# Patient Record
Sex: MALE | Race: BLACK OR AFRICAN AMERICAN | HISPANIC OR LATINO | Employment: UNEMPLOYED | URBAN - METROPOLITAN AREA
[De-identification: names, ages, dates, MRNs, and addresses within clinical notes are randomized per-mention and may not be internally consistent; named-entity substitution may affect disease eponyms.]

---
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
## 2020-03-06 ENCOUNTER — OFFICE VISIT (OUTPATIENT)
Dept: FAMILY MEDICINE | Facility: CLINIC | Age: 20
End: 2020-03-06
Payer: COMMERCIAL

## 2020-03-06 VITALS
RESPIRATION RATE: 16 BRPM | DIASTOLIC BLOOD PRESSURE: 86 MMHG | HEART RATE: 84 BPM | TEMPERATURE: 98.4 F | HEIGHT: 65 IN | BODY MASS INDEX: 38.35 KG/M2 | WEIGHT: 230.2 LBS | SYSTOLIC BLOOD PRESSURE: 133 MMHG | OXYGEN SATURATION: 95 %

## 2020-03-06 DIAGNOSIS — Z00.121 ENCOUNTER FOR ROUTINE CHILD HEALTH EXAMINATION WITH ABNORMAL FINDINGS: Primary | ICD-10-CM

## 2020-03-06 DIAGNOSIS — Z51.81 ENCOUNTER FOR THERAPEUTIC DRUG MONITORING: ICD-10-CM

## 2020-03-06 DIAGNOSIS — Z13.9 SCREENING FOR CONDITION: ICD-10-CM

## 2020-03-06 DIAGNOSIS — Z00.00 ROUTINE GENERAL MEDICAL EXAMINATION AT A HEALTH CARE FACILITY: ICD-10-CM

## 2020-03-06 LAB
CHOLEST SERPL-MCNC: 202.8 MG/DL (ref 0–200)
CHOLEST/HDLC SERPL: 4.2 {RATIO} (ref 0–5)
HBA1C MFR BLD: 4.9 % (ref 4.1–5.7)
HDLC SERPL-MCNC: 47.9 MG/DL
LDLC SERPL CALC-MCNC: 140 MG/DL (ref 0–129)
TRIGL SERPL-MCNC: 72.5 MG/DL (ref 0–150)
VLDL CHOLESTEROL: 14.5 MG/DL (ref 7–32)

## 2020-03-06 ASSESSMENT — ANXIETY QUESTIONNAIRES
5. BEING SO RESTLESS THAT IT IS HARD TO SIT STILL: NOT AT ALL
3. WORRYING TOO MUCH ABOUT DIFFERENT THINGS: NOT AT ALL
6. BECOMING EASILY ANNOYED OR IRRITABLE: NOT AT ALL
1. FEELING NERVOUS, ANXIOUS, OR ON EDGE: NOT AT ALL
IF YOU CHECKED OFF ANY PROBLEMS ON THIS QUESTIONNAIRE, HOW DIFFICULT HAVE THESE PROBLEMS MADE IT FOR YOU TO DO YOUR WORK, TAKE CARE OF THINGS AT HOME, OR GET ALONG WITH OTHER PEOPLE: NOT DIFFICULT AT ALL
7. FEELING AFRAID AS IF SOMETHING AWFUL MIGHT HAPPEN: NOT AT ALL
GAD7 TOTAL SCORE: 0
2. NOT BEING ABLE TO STOP OR CONTROL WORRYING: NOT AT ALL

## 2020-03-06 ASSESSMENT — PATIENT HEALTH QUESTIONNAIRE - PHQ9: 5. POOR APPETITE OR OVEREATING: NOT AT ALL

## 2020-03-06 ASSESSMENT — MIFFLIN-ST. JEOR: SCORE: 1986.06

## 2020-03-06 ASSESSMENT — PAIN SCALES - GENERAL: PAINLEVEL: NO PAIN (0)

## 2020-03-06 NOTE — PROGRESS NOTES
"  3  SUBJECTIVE:   CC: Dre Mcdaniel Jr. is an 19 year old male who presents for preventive health visit.     Healthy Habits:    Do you get at least three servings of calcium containing foods daily (dairy, green leafy vegetables, etc.)? yes    Amount of exercise or daily activities, outside of work: 3 day(s) per week    Problems taking medications regularly No    Medication side effects: Yes weight gain    Have you had an eye exam in the past two years? no    Do you see a dentist twice per year? yes    Do you have sleep apnea, excessive snoring or daytime drowsiness?yes          Today's PHQ-2 Score:   PHQ-2 ( 1999 Pfizer) 3/6/2020 10/29/2019   Q1: Little interest or pleasure in doing things 0 1   Q2: Feeling down, depressed or hopeless 0 1   PHQ-2 Score 0 2     Patient scores a 0 on PHQ 9 and 2 though discussion with patient's group home staff he reports that things actual mood is is is significantly depressed.  Abuse: Current or Past(Physical, Sexual or Emotional)- No  Do you feel safe in your environment? Yes        Social History     Tobacco Use     Smoking status: Current Every Day Smoker     Packs/day: 0.50     Smokeless tobacco: Never Used   Substance Use Topics     Alcohol use: Yes     Comment: sometimes, denies weekly use     If you drink alcohol do you typically have >3 drinks per day or >7 drinks per week? No                      Tanner Sierra MD  [facility]  Child & Teen Check Up Year 18-20         Health History       Growth Percentile:    Wt Readings from Last 3 Encounters:   03/06/20 104.4 kg (230 lb 3.2 oz) (98 %)*   10/29/19 102.2 kg (225 lb 6.4 oz) (98 %)*   08/14/19 96.3 kg (212 lb 6.4 oz) (96 %)*     * Growth percentiles are based on CDC (Boys, 2-20 Years) data.      Ht Readings from Last 2 Encounters:   03/06/20 1.651 m (5' 5\") (5 %)*   10/29/19 1.651 m (5' 5\") (5 %)*     * Growth percentiles are based on CDC (Boys, 2-20 Years) data.    >99 %ile based on CDC (Boys, 2-20 " "Years) BMI-for-age based on body measurements available as of 3/6/2020.    Visit Vitals: /86   Pulse 84   Temp 98.4  F (36.9  C) (Oral)   Resp 16   Ht 1.651 m (5' 5\")   Wt 104.4 kg (230 lb 3.2 oz)   SpO2 95%   BMI 38.31 kg/m    BP Percentile: Blood pressure percentiles are not available for patients who are 18 years or older.    Informant: Patient,     Patient, Family speaks English and so an  was not used.  Family History:   Family History   Problem Relation Age of Onset     Diabetes Father      Coronary Artery Disease No family hx of      Hypertension No family hx of      Hyperlipidemia No family hx of      Cerebrovascular Disease No family hx of      Breast Cancer No family hx of      Colon Cancer No family hx of      Prostate Cancer No family hx of      Glaucoma No family hx of      Macular Degeneration No family hx of        Dyslipidemia Screening:  Pediatric hyperlipidemia risk factors discussed today: Family history of early cardiac disease and Elevated BMI >85th percentile  Lipid screening performed (recommended if any risk factors): Yes    Social History:     Did the family/guardian worry about wether their food would run out before they got money to buy more? No  Did the family/guardian find that the food they bought didn't last long enough and they didn't have money to get more?  No    Social History     Socioeconomic History     Marital status: Single     Spouse name: None     Number of children: None     Years of education: None     Highest education level: None   Occupational History     None   Social Needs     Financial resource strain: None     Food insecurity:     Worry: None     Inability: None     Transportation needs:     Medical: None     Non-medical: None   Tobacco Use     Smoking status: Current Every Day Smoker     Packs/day: 0.50     Smokeless tobacco: Never Used   Substance and Sexual Activity     Alcohol use: Yes     Comment: sometimes, denies weekly use     Drug " use: No     Sexual activity: Never   Lifestyle     Physical activity:     Days per week: None     Minutes per session: None     Stress: None   Relationships     Social connections:     Talks on phone: None     Gets together: None     Attends [employer] service: None     Active member of club or organization: None     Attends meetings of clubs or organizations: None     Relationship status: None     Intimate partner violence:     Fear of current or ex partner: None     Emotionally abused: None     Physically abused: None     Forced sexual activity: None   Other Topics Concern     None   Social History Narrative     None           Medical History:   Past Medical History:   Diagnosis Date     ADD (attention deficit disorder)      Anxiety      Depressive disorder      Hypertension        Family History and past Medical History reviewed and unchanged/updated.      Vision Screen: Passed.  Hearing Screen: Passed.  Parental/or patient concerns: Parent was not accompanying him today with group home staff notes expressed significant concern about his excessive carbohydrate intake and that he would get another house member to go and buy sweets for him.    Daily Activities: He has not been very active lately reports that he does when to get a job at [employer].    Nutrition:    Describe intake: Not very very mostly carbohydrates.    Environmental Risks:  TB exposure: No  Guns in house:None    STI Screening:  STI (including HIV) risk behaviors discussed today: No  HIV Screening (required once between ages 15-18 yrs): Ordered today  Other STI screening preformed (recommended if risk factors): No    Dental:  Have you been to a dentist this year? Yes and verbally encouraged family to continue to have annual dental check-up       Mental Health:  Teen Screen Discussed?: Yes       HEADSSS SCREENING:    HOME  Do you get along with your parents/siblings? Yes  Do you have at least one adult you can really talk to? Yes    EDUCATION  Do you  "have career or college plans after high school? Yes    ACTIVITIES  Do you get some exercise at least 3 times a week? Yes  Do you feel you are about the right weight for your height? No    DRUGS  Do you smoke cigarettes or chew tobacco? Yes 3 cigs a day   Do you drink alcohol or use any type of drugs? No    SEX   Have you ever had sex? No    SUICIDE/DEPRESSION  Do you ever feel down or depressed?     SAFETY  Do you feel afraid in any of your relationships? No         ROS   GENERAL: no recent fevers and activity level has been normal  SKIN: Negative for rash, birthmarks, acne, pigmentation changes  HEENT: Negative for hearing problems, vision problems, nasal congestion, eye discharge and eye redness  RESP: No cough, wheezing, difficulty breathing  CV: No cyanosis, fatigue with feeding  GI: Normal stools for age, no diarrhea or constipation   : Normal urination, no disharge or painful urination  MS: No swelling, muscle weakness, joint problems  NEURO: Moves all extremeties normally, normal activity for age  ALLERGY/IMMUNE: See allergy in history         Physical Exam:   /86   Pulse 84   Temp 98.4  F (36.9  C) (Oral)   Resp 16   Ht 1.651 m (5' 5\")   Wt 104.4 kg (230 lb 3.2 oz)   SpO2 95%   BMI 38.31 kg/m       GENERAL: Alert, well nourished, well developed, no acute distress, interacts appropriately for age  SKIN: skin is clear, no rash, acne, abnormal pigmentation or lesions  HEAD: The head is normocephalic.  EYES:The conjunctivae and cornea normal. PERRL, EOMI, Light reflex is symmetric and no eye movement on cover/uncover test. Sharp optic discs  EARS: The external auditory canals are clear and the tympanic membranes are normal; gray and transluscent.  NOSE: Clear, no discharge or congestion  MOUTH/THROAT: The throat is clear, tonsils:normal, no exudate or lesions. Normal teeth without obvious abnormalities  NECK: The neck is supple and thyroid is normal, no masses  LYMPH NODES: No adenopathy  LUNGS: " The lung fields are clear to auscultation,no rales, rhonchi, wheezing or retractions  HEART: The precordium is quiet. Rhythm is regular. S1 and S2 are normal. No murmurs.  ABDOMEN: The bowel sounds are normal. Abdomen soft, non tender,  non distended, no masses or hepatosplenomegaly.  M-GENITALIA: Patient declined genital exam.  M-BREASTS: Normal, no gynecomastia or abnormalities  EXTREMITIES: Symmetric extremities, FROM, no deformities. Spine is straight, no scoliosis  NEUROLOGIC: No focal findings. Cranial nerves grossly intact: DTR's normal. Normal gait, strength and tone         Assessment and Plan   Reason for Visit:   Chief Complaint   Patient presents with     Physical     Patient is here for annual exam      Forms     Medical/ specialist visit information      Additional Diagnoses: Patient has a schizophrenia spectrum disorder and is on atypical antipsychotics which is likely contributed to his obesity.    No referrals were made today. and patient was asked to return in 2 to 4 weeks.    Patient Health Questionnaire - 9   PHQ 8/31/2018 3/6/2020 3/7/2020   PHQ-9 Total Score 6 0 4   Q9: Thoughts of better off dead/self-harm past 2 weeks Several days Not at all Not at all   F/U: Thoughts of suicide or self-harm Yes - -   F/U: Self harm-plan Yes - -   F/U: Self-harm action No - -   F/U: Safety concerns Yes - -            Immunizations:    Hx immunization reactions?  No  Immunization schedule reviewed: Yes:  Following immunizations advised:  Tdap (if not given when entering 7th grade) Up to date for this immunization  Influenza if in season:Up to date for this immunization  Meningococcal (MCV) (If given before age 16 needs a booster at 16+ yo Up to date for this immunization  HPV Vaccine (Gardasil)  recommended for all at age 11 years: Up to date for this immunization    Labs:  Because patient is obesity and risk factors being on atypical antipsychotics will get an A1c and lipids today.  Results for orders placed  or performed in visit on 03/06/20 (from the past 48 hour(s))   Lipid Cascade ([facility])   Result Value Ref Range    Cholesterol 202.8 (H) 0.0 - 200.0 mg/dL    Cholesterol/HDL Ratio 4.2 0.0 - 5.0    HDL Cholesterol 47.9 >40.0 mg/dL    Triglycerides 72.5 0.0 - 150.0 mg/dL    VLDL Cholesterol 14.5 7.0 - 32.0 mg/dL    LDL Cholesterol Calculated 140 (H) 0 - 129 mg/dL   Hemoglobin A1c ([facility])   Result Value Ref Range    Hemoglobin A1C 4.9 4.1 - 5.7 %     I had a long discussion with patient and his group home staff about his obesity  Set a calorie intake patient at this point is not motivated to change his calories of the does report that he would like to increase his his activity though from previous experience she is likely to  Schedule next visit in 2 years    Tanner Sierra MD

## 2020-03-06 NOTE — LETTER
March 7, 2020      Dre Mcdaniel Jr.  [address] [place] [id number]        Dear Dre,    Thank you for getting your care at [facility]. Please see below for your test results.  Your diabetes test was normal but your cholesterol has risen above 200 I am concerned about this and will talk about this at your next visit in about a month.    Resulted Orders   Lipid Cascade ([facility])   Result Value Ref Range    Cholesterol 202.8 (H) 0.0 - 200.0 mg/dL    Cholesterol/HDL Ratio 4.2 0.0 - 5.0    HDL Cholesterol 47.9 >40.0 mg/dL    Triglycerides 72.5 0.0 - 150.0 mg/dL    VLDL Cholesterol 14.5 7.0 - 32.0 mg/dL    LDL Cholesterol Calculated 140 (H) 0 - 129 mg/dL   Hemoglobin A1c ([facility])   Result Value Ref Range    Hemoglobin A1C 4.9 4.1 - 5.7 %       As we discussed at our last visit, please follow up with a clinic appointment to review these results further.    Sincerely,    Tanner Sierra MD

## 2020-03-07 ASSESSMENT — ANXIETY QUESTIONNAIRES: GAD7 TOTAL SCORE: 0

## 2020-03-07 ASSESSMENT — PATIENT HEALTH QUESTIONNAIRE - PHQ9: SUM OF ALL RESPONSES TO PHQ QUESTIONS 1-9: 4

## 2020-03-09 LAB — HIV 1+2 AB+HIV1 P24 AG SERPL QL IA: NONREACTIVE

## 2020-04-23 DIAGNOSIS — F51.01 PRIMARY INSOMNIA: ICD-10-CM

## 2020-04-23 NOTE — TELEPHONE ENCOUNTER

## 2020-06-02 DIAGNOSIS — F41.9 ANXIETY: ICD-10-CM

## 2020-06-02 DIAGNOSIS — K59.03 DRUG-INDUCED CONSTIPATION: ICD-10-CM

## 2020-06-02 DIAGNOSIS — F29 SCHIZOPHRENIA SPECTRUM DISORDER WITH PSYCHOTIC DISORDER TYPE NOT YET DETERMINED (H): ICD-10-CM

## 2020-06-02 RX ORDER — SENNOSIDES A AND B 8.6 MG/1
17.2 TABLET, FILM COATED ORAL DAILY PRN
Qty: 62 TABLET | Refills: 1 | Status: SHIPPED | OUTPATIENT
Start: 2020-06-02 | End: 2023-06-09

## 2020-06-02 RX ORDER — HYDROXYZINE PAMOATE 50 MG/1
50 CAPSULE ORAL EVERY 6 HOURS PRN
Qty: 124 CAPSULE | Refills: 1 | Status: SHIPPED | OUTPATIENT
Start: 2020-06-02 | End: 2023-07-14

## 2020-06-02 NOTE — TELEPHONE ENCOUNTER

## 2020-06-22 DIAGNOSIS — F29 SCHIZOPHRENIA SPECTRUM DISORDER WITH PSYCHOTIC DISORDER TYPE NOT YET DETERMINED (H): ICD-10-CM

## 2020-06-22 DIAGNOSIS — J30.1 NON-SEASONAL ALLERGIC RHINITIS DUE TO POLLEN: ICD-10-CM

## 2020-06-22 RX ORDER — LORATADINE 10 MG/1
10 TABLET ORAL DAILY
Qty: 90 TABLET | Refills: 3 | Status: SHIPPED | OUTPATIENT
Start: 2020-06-22 | End: 2021-05-13

## 2020-06-22 NOTE — TELEPHONE ENCOUNTER
"Request for medication refill: Loratadine     Providers if patient needs an appointment and you are willing to give a one month supply please refill for one month and  send a letter/MyChart using \".SMILLIMITEDREFILL\" .smillimited and route chart to \"P SMI \" (Giving one month refill in non controlled medications is strongly recommended before denial)    If refill has been denied, meaning absolutely no refills without visit, please complete the smart phrase \".smirxrefuse\" and route it to the \"P SMI MED REFILLS\"  pool to inform the patient and the pharmacy.    Kortney Gonzales, [facility]        "

## 2020-07-31 NOTE — PROGRESS NOTES
HPI:  Patient presents for a annual visit. Patient sees well without glasses.       Pertinent Medical History:    Borderline intellectual functioning.     Neuroleptic malignant syndrome    Tachycardia    Obesity    Shizophrenia spectrum disorder    Ocular History:    None    Eye Medications:    None    Assessment and Plan:  1.   Myopia, both eyes.     DVO. Polycarbonate.       Medical History:  Past Medical History:   Diagnosis Date     ADD (attention deficit disorder)      Anxiety      Depressive disorder      Hypertension        Medications:  Current Outpatient Medications   Medication Sig Dispense Refill     benztropine (COGENTIN) 0.5 MG tablet Take 1 tablet (0.5 mg) by mouth daily 31 tablet 1     busPIRone (BUSPAR) 15 MG tablet Take 1 tablet (15 mg) by mouth 3 times daily 93 tablet 1     hydrOXYzine (VISTARIL) 50 MG capsule Take 1 capsule (50 mg) by mouth every 6 hours as needed for anxiety 124 capsule 1     loratadine (CLARITIN) 10 MG tablet Take 1 tablet (10 mg) by mouth daily 90 tablet 3     melatonin 5 MG tablet Take 1 tablet (5 mg) by mouth nightly as needed for sleep 90 tablet 1     propranolol (INDERAL) 20 MG tablet Take 20 mg by mouth daily       QUEtiapine (SEROQUEL) 200 MG tablet Take 1 tablet (200 mg) by mouth At Bedtime 31 tablet 1     risperiDONE (RISPERDAL) 1 MG tablet Take 1 tablet (1 mg) by mouth At Bedtime (added to 6 mg total dose 7mg) no further refills until seen 31 tablet 0     risperiDONE (RISPERDAL) 3 MG tablet Take 2 tablets (6 mg) by mouth At Bedtime (Added to 1 mg tab total dose 7 mg) 62 tablet 1     senna (SENNA-TIME) 8.6 MG tablet Take 2 tablets (17.2 mg) by mouth daily as needed (constipation) 62 tablet 1     sertraline (ZOLOFT) 50 MG tablet Take 1 tablet (50 mg) by mouth every morning 31 tablet 1   Complete documentation of historical and exam elements from today's encounter can be found in the full encounter summary report (not reduplicated in this progress note). I personally  obtained the chief complaint(s) and history of present illness.  I confirmed and edited as necessary the review of systems, past medical/surgical history, family history, social history, and examination findings as documented by others; and I examined the patient myself. I personally reviewed the relevant tests, images, and reports as documented above. I formulated and edited as necessary the assessment and plan and discussed the findings and management plan with the patient and family. - Ferdinand Pal, RENALDO

## 2020-08-03 ENCOUNTER — OFFICE VISIT (OUTPATIENT)
Dept: OPHTHALMOLOGY | Facility: CLINIC | Age: 20
End: 2020-08-03
Attending: OPTOMETRIST
Payer: COMMERCIAL

## 2020-08-03 DIAGNOSIS — H52.13 MYOPIA OF BOTH EYES: Primary | ICD-10-CM

## 2020-08-03 PROCEDURE — 92015 DETERMINE REFRACTIVE STATE: CPT | Mod: ZF

## 2020-08-03 PROCEDURE — G0463 HOSPITAL OUTPT CLINIC VISIT: HCPCS | Mod: ZF

## 2020-08-03 ASSESSMENT — EXTERNAL EXAM - LEFT EYE: OS_EXAM: NORMAL

## 2020-08-03 ASSESSMENT — VISUAL ACUITY
METHOD: SNELLEN - LINEAR
OS_CC: 20/25
CORRECTION_TYPE: GLASSES
OD_CC: 20/20

## 2020-08-03 ASSESSMENT — SLIT LAMP EXAM - LIDS
COMMENTS: NORMAL
COMMENTS: NORMAL

## 2020-08-03 ASSESSMENT — REFRACTION_MANIFEST
OD_CYLINDER: SPHERE
OS_CYLINDER: +0.50
OS_SPHERE: -0.75
OD_SPHERE: -0.50
OS_AXIS: 100

## 2020-08-03 ASSESSMENT — TONOMETRY
OS_IOP_MMHG: 15
OD_IOP_MMHG: 16
IOP_METHOD: TONOPEN

## 2020-08-03 ASSESSMENT — REFRACTION_WEARINGRX
OD_CYLINDER: SPHERE
OS_SPHERE: -0.75
OD_SPHERE: -0.50
OS_CYLINDER: SPHERE

## 2020-08-03 ASSESSMENT — EXTERNAL EXAM - RIGHT EYE: OD_EXAM: NORMAL

## 2020-08-03 ASSESSMENT — CONF VISUAL FIELD
OS_NORMAL: 1
METHOD: COUNTING FINGERS
OD_NORMAL: 1

## 2020-08-03 NOTE — NURSING NOTE
Chief Complaints and History of Present Illnesses   Patient presents with     Myopia Follow Up     1 year follow up both eyes.       Chief Complaint(s) and History of Present Illness(es)     Myopia Follow Up     Laterality: both eyes    Comments: 1 year follow up both eyes.                Comments     Pt states vision is the same as last visit. No eye pain today.  No flashes or floaters.    Pt is prediabetic, does not check sugars.  Lab Results       Component                Value               Date                       A1C                      4.9                 03/06/2020                 A1C                      4.8                 03/21/2019              LEYDA Caldera August 3, 2020 11:04 AM

## 2020-09-17 DIAGNOSIS — F51.01 PRIMARY INSOMNIA: ICD-10-CM

## 2020-09-17 NOTE — TELEPHONE ENCOUNTER

## 2020-10-30 ENCOUNTER — OFFICE VISIT (OUTPATIENT)
Dept: FAMILY MEDICINE | Facility: CLINIC | Age: 20
End: 2020-10-30
Payer: COMMERCIAL

## 2020-10-30 VITALS
OXYGEN SATURATION: 98 % | BODY MASS INDEX: 37.49 KG/M2 | TEMPERATURE: 98.7 F | HEIGHT: 65 IN | DIASTOLIC BLOOD PRESSURE: 84 MMHG | SYSTOLIC BLOOD PRESSURE: 118 MMHG | WEIGHT: 225 LBS | HEART RATE: 80 BPM

## 2020-10-30 DIAGNOSIS — E66.09 CLASS 1 OBESITY DUE TO EXCESS CALORIES WITHOUT SERIOUS COMORBIDITY WITH BODY MASS INDEX (BMI) OF 34.0 TO 34.9 IN ADULT: ICD-10-CM

## 2020-10-30 DIAGNOSIS — Z00.00 ENCOUNTER FOR PREVENTIVE CARE: Primary | ICD-10-CM

## 2020-10-30 DIAGNOSIS — E66.811 CLASS 1 OBESITY DUE TO EXCESS CALORIES WITHOUT SERIOUS COMORBIDITY WITH BODY MASS INDEX (BMI) OF 34.0 TO 34.9 IN ADULT: ICD-10-CM

## 2020-10-30 DIAGNOSIS — Z23 NEED FOR PROPHYLACTIC VACCINATION AND INOCULATION AGAINST INFLUENZA: ICD-10-CM

## 2020-10-30 PROCEDURE — 99213 OFFICE O/P EST LOW 20 MIN: CPT | Mod: 25 | Performed by: FAMILY MEDICINE

## 2020-10-30 PROCEDURE — 90472 IMMUNIZATION ADMIN EACH ADD: CPT | Performed by: FAMILY MEDICINE

## 2020-10-30 PROCEDURE — 90686 IIV4 VACC NO PRSV 0.5 ML IM: CPT | Performed by: FAMILY MEDICINE

## 2020-10-30 PROCEDURE — 90471 IMMUNIZATION ADMIN: CPT | Performed by: FAMILY MEDICINE

## 2020-10-30 PROCEDURE — 90732 PPSV23 VACC 2 YRS+ SUBQ/IM: CPT | Performed by: FAMILY MEDICINE

## 2020-10-30 ASSESSMENT — MIFFLIN-ST. JEOR: SCORE: 1957.47

## 2020-10-30 NOTE — PATIENT INSTRUCTIONS
Here is the plan from today's visit    1. Encounter for preventive care  Make a lab appointment for quanteferron and Hep C  - Hepatitis C Screen Reflex to HCV RNA Quant and Genotype; Future    2 Vitaliy loss   2 fruits or vegies a day and increase exercise to 3 walks a week   Please call or return to clinic if your symptoms don't go away.    Follow up plan  Return in about 6 months (around 4/30/2021).     Thank you for coming to [facility] today.  Lab Testing:  **If you had lab testing today and your results are reassuring or normal they will be mailed to you or sent through [facility] within 7 days.   **If the lab tests need quick action we will call you with the results.  The phone number we will call with results is # [phone] (home) . If this is not the best number please call our clinic and change the number.  Medication Refills:  If you need any refills please call your pharmacy and they will contact us.   If you need to  your refill at a new pharmacy, please contact the new pharmacy directly. The new pharmacy will help you get your medications transferred faster.   Scheduling:  If you have any concerns about today's visit or wish to schedule another appointment please call our office during normal business hours [phone] (8-5:00 M-F)        Medical Concerns:  If you have urgent medical concerns please call [phone] at any time of the day.    Tanner Sierra MD

## 2020-10-30 NOTE — PROGRESS NOTES
Preceptor Attestation:    I was present with the medical student who participated in the service and in the documentation of this note. I have verified the history and personally performed the physical exam and medical decision making, and have verified the content of the note, which accurately reflects my assessment of the patient and the plan of care.   Tanner Sierra MD            Dre is a 20 year old  who presents for   Patient presents with:  Forms: [employer] mobility forms  Imm/Inj: Flu Shot      Assessment and Plan      1. Encounter for preventive care  Adonis presents today with his house leader Rich.  He needed some forms filled out in terms of [employer] mobility and also needed some testing done.  He will return to get the hepatitis C screening and the pneumococcal vaccine.  - Hepatitis C Screen Reflex to HCV RNA Quant and Genotype; Future  - Pneumococcal vaccine 23 valent PPSV23  (Pneumovax) [57167]    2. Class 1 obesity due to excess calories without serious comorbidity with body mass index (BMI) of 34.0 to 34.9 in adult  We did spend a considerable time talking about his weight it is likely related to his antipsychotics his high calorie carbohydrate diet and his relatively sedentary lifestyle.  I did talk about different options at this point he is not willing to consider significant changes in his diet or exercise plan.  He is willing to try eat 1-2 fruits or vegetables a day to be mostly bananas.  He also committed to going for a walk once or twice a week.    3. Need for prophylactic vaccination and inoculation against influenza  He was given a influenza vaccine today  - INFLUENZA VACCINE IM > 6 MONTHS VALENT IIV4 [82805]     Return in about 6 months (around 4/30/2021).    There are no discontinued medications.      Tanner Sierra MD         [facility]       Dre is a 20 year old  who presents for   Patient presents with:  Forms: [employer] mobility forms  Imm/Inj: Flu Shot        Visit [facility]  1. Needs flushot,  "pneumonia and mobility forms  Dre presents with his care assistant Rich.  We discussed the need for testing for hepatitis C and immunizations.  2. Obesity  Would like to avoid DM. Will exercise more. Has unrealistic attitudes about weight loss.   And when expresses a strong desire to avoid getting diabetes.  He continues to have unrealistic expectations of weight control.  He has lost about 5 pounds since his last visit so this is a huge positive      Problem, Medication and Allergy Lists were reviewed and updated if needed..  Patient is an established patient of this clinic.         Review of Systems:   Review of Systems  7 point review of symptoms was otherwise negative except as noted in HPI         Physical Exam:     Vitals:    10/30/20 1319   BP: 118/84   Pulse: 80   Temp: 98.7  F (37.1  C)   TempSrc: Oral   SpO2: 98%   Weight: 102.1 kg (225 lb)   Height: 1.651 m (5' 5\")     Body mass index is 37.44 kg/m .  Vitals were reviewed and were normal     Physical Exam  Vitals signs and nursing note reviewed.   Constitutional:       General: He is not in acute distress.     Appearance: He is well-developed.   Musculoskeletal:      Comments: Right toenail is dysmorphic and exuding a small amount of serous discharge and blood.   Neurological:      Mental Status: He is alert and oriented to person, place, and time.   Psychiatric:         Mood and Affect: Affect is blunt.         Speech: Speech is delayed.         Behavior: Behavior is slowed and withdrawn.         Thought Content: Thought content is not paranoid or delusional.         Judgment: Judgment is not impulsive.      Comments: MENTAL STATUS EXAM  Appearance: appropriate  Attitude: cooperative  Behavior: normal  Eye Contact: decreased   Speech: flat  Orientation: oriented to person , place, time and situation  Mood: depressed   Affect: Mood affect was flat  Thought Process: clear  Suicidal Ideation: reports no recent thoughts, no intention  Hallucination: " no  Overall much improved over previous visit.  He is more engaged and willing to problem solve             Results:   Results are ordered and pending    Options for treatment and follow-up care were reviewed with the patient. Dre Mcdaniel Jr.  engaged in the decision making process and verbalized understanding of the options discussed and agreed with the final plan.  Tanner Sierra MD  [facility]

## 2020-11-11 DIAGNOSIS — Z00.00 ENCOUNTER FOR PREVENTIVE CARE: ICD-10-CM

## 2020-11-11 DIAGNOSIS — Z00.00 ROUTINE GENERAL MEDICAL EXAMINATION AT A HEALTH CARE FACILITY: ICD-10-CM

## 2020-11-11 PROCEDURE — 36415 COLL VENOUS BLD VENIPUNCTURE: CPT | Performed by: FAMILY MEDICINE

## 2020-11-11 PROCEDURE — 86803 HEPATITIS C AB TEST: CPT | Performed by: FAMILY MEDICINE

## 2020-11-11 PROCEDURE — 86481 TB AG RESPONSE T-CELL SUSP: CPT | Performed by: FAMILY MEDICINE

## 2020-11-12 LAB — HCV AB SERPL QL IA: NONREACTIVE

## 2020-11-13 LAB
GAMMA INTERFERON BACKGROUND BLD IA-ACNC: 0.06 IU/ML
GAMMA INTERFERON BACKGROUND BLD IA-ACNC: NORMAL IU/ML
M TB IFN-G BLD-IMP: NORMAL
M TB IFN-G CD4+ BCKGRND COR BLD-ACNC: 9.94 IU/ML
M TB IFN-G CD4+ BCKGRND COR BLD-ACNC: NORMAL IU/ML
M TB TUBERC IFN-G BLD QL: NEGATIVE
MITOGEN IGNF BCKGRD COR BLD-ACNC: 0.03 IU/ML
MITOGEN IGNF BCKGRD COR BLD-ACNC: 0.12 IU/ML
MITOGEN IGNF BCKGRD COR BLD-ACNC: NORMAL IU/ML
MITOGEN IGNF BCKGRD COR BLD-ACNC: NORMAL IU/ML

## 2021-01-07 DIAGNOSIS — F51.01 PRIMARY INSOMNIA: ICD-10-CM

## 2021-03-01 ENCOUNTER — OFFICE VISIT (OUTPATIENT)
Dept: FAMILY MEDICINE | Facility: CLINIC | Age: 21
End: 2021-03-01
Payer: MEDICAID

## 2021-03-01 ENCOUNTER — MEDICAL CORRESPONDENCE (OUTPATIENT)
Dept: HEALTH INFORMATION MANAGEMENT | Facility: CLINIC | Age: 21
End: 2021-03-01

## 2021-03-01 VITALS
BODY MASS INDEX: 37.99 KG/M2 | SYSTOLIC BLOOD PRESSURE: 117 MMHG | DIASTOLIC BLOOD PRESSURE: 85 MMHG | WEIGHT: 228 LBS | HEART RATE: 87 BPM | HEIGHT: 65 IN | TEMPERATURE: 98.1 F | RESPIRATION RATE: 24 BRPM | OXYGEN SATURATION: 96 %

## 2021-03-01 DIAGNOSIS — E66.811 CLASS 1 OBESITY DUE TO EXCESS CALORIES WITHOUT SERIOUS COMORBIDITY WITH BODY MASS INDEX (BMI) OF 34.0 TO 34.9 IN ADULT: ICD-10-CM

## 2021-03-01 DIAGNOSIS — E66.09 CLASS 1 OBESITY DUE TO EXCESS CALORIES WITHOUT SERIOUS COMORBIDITY WITH BODY MASS INDEX (BMI) OF 34.0 TO 34.9 IN ADULT: ICD-10-CM

## 2021-03-01 DIAGNOSIS — F29 SCHIZOPHRENIA SPECTRUM DISORDER WITH PSYCHOTIC DISORDER TYPE NOT YET DETERMINED (H): ICD-10-CM

## 2021-03-01 DIAGNOSIS — Z00.129 ENCOUNTER FOR ROUTINE CHILD HEALTH EXAMINATION WITHOUT ABNORMAL FINDINGS: Primary | ICD-10-CM

## 2021-03-01 DIAGNOSIS — F51.01 PRIMARY INSOMNIA: ICD-10-CM

## 2021-03-01 PROBLEM — G21.0 NMS (NEUROLEPTIC MALIGNANT SYNDROME): Status: RESOLVED | Noted: 2018-10-07 | Resolved: 2021-03-01

## 2021-03-01 PROBLEM — R00.0 TACHYCARDIA: Status: RESOLVED | Noted: 2018-10-08 | Resolved: 2021-03-01

## 2021-03-01 PROBLEM — R51.9 PERSISTENT HEADACHES: Status: RESOLVED | Noted: 2019-08-01 | Resolved: 2021-03-01

## 2021-03-01 PROBLEM — R45.851 SUICIDAL IDEATION: Status: RESOLVED | Noted: 2018-08-20 | Resolved: 2021-03-01

## 2021-03-01 LAB
ANION GAP SERPL CALCULATED.3IONS-SCNC: 4 MMOL/L (ref 3–14)
BUN SERPL-MCNC: 9 MG/DL (ref 7–30)
CALCIUM SERPL-MCNC: 9.5 MG/DL (ref 8.5–10.1)
CHLORIDE SERPL-SCNC: 107 MMOL/L (ref 94–109)
CHOLEST SERPL-MCNC: 204 MG/DL
CO2 SERPL-SCNC: 27 MMOL/L (ref 20–32)
CREAT SERPL-MCNC: 0.71 MG/DL (ref 0.66–1.25)
GFR SERPL CREATININE-BSD FRML MDRD: >90 ML/MIN/{1.73_M2}
GLUCOSE SERPL-MCNC: 93 MG/DL (ref 70–99)
HBA1C MFR BLD: 4.9 % (ref 4.1–5.7)
HDLC SERPL-MCNC: 51 MG/DL
LDLC SERPL CALC-MCNC: 135 MG/DL
NONHDLC SERPL-MCNC: 153 MG/DL
POTASSIUM SERPL-SCNC: 3.9 MMOL/L (ref 3.4–5.3)
SODIUM SERPL-SCNC: 138 MMOL/L (ref 133–144)
TRIGL SERPL-MCNC: 92 MG/DL

## 2021-03-01 PROCEDURE — 36415 COLL VENOUS BLD VENIPUNCTURE: CPT | Performed by: FAMILY MEDICINE

## 2021-03-01 PROCEDURE — 83036 HEMOGLOBIN GLYCOSYLATED A1C: CPT | Performed by: FAMILY MEDICINE

## 2021-03-01 PROCEDURE — 99395 PREV VISIT EST AGE 18-39: CPT | Performed by: FAMILY MEDICINE

## 2021-03-01 PROCEDURE — 80061 LIPID PANEL: CPT | Performed by: FAMILY MEDICINE

## 2021-03-01 PROCEDURE — 80048 BASIC METABOLIC PNL TOTAL CA: CPT | Performed by: FAMILY MEDICINE

## 2021-03-01 ASSESSMENT — MIFFLIN-ST. JEOR: SCORE: 1971.08

## 2021-03-01 NOTE — LETTER
March 2, 2021      Dre Mcdaniel Jr.  [address] [place] [id number]        Dear Dre,    Thank you for getting your care at [facility]. Please see below for your test results. You have no sign of Diabetes, Your cholesterol is high -this should be managed by reducing the sugar and fat in your diet.    Resulted Orders   Hemoglobin A1c ([facility])   Result Value Ref Range    Hemoglobin A1C 4.9 4.1 - 5.7 %   Basic metabolic panel   Result Value Ref Range    Sodium 138 133 - 144 mmol/L    Potassium 3.9 3.4 - 5.3 mmol/L    Chloride 107 94 - 109 mmol/L    Carbon Dioxide 27 20 - 32 mmol/L    Anion Gap 4 3 - 14 mmol/L    Glucose 93 70 - 99 mg/dL    Urea Nitrogen 9 7 - 30 mg/dL    Creatinine 0.71 0.66 - 1.25 mg/dL    GFR Estimate >90 >60 mL/min/[1.73_m2]      Comment:      Non  GFR Calc  Starting 12/18/2018, serum creatinine based estimated GFR (eGFR) will be   calculated using the Chronic Kidney Disease Epidemiology Collaboration   (CKD-EPI) equation.      GFR Estimate If Black >90 >60 mL/min/[1.73_m2]      Comment:       GFR Calc  Starting 12/18/2018, serum creatinine based estimated GFR (eGFR) will be   calculated using the Chronic Kidney Disease Epidemiology Collaboration   (CKD-EPI) equation.      Calcium 9.5 8.5 - 10.1 mg/dL   Lipid panel reflex to direct LDL Fasting   Result Value Ref Range    Cholesterol 204 (H) <200 mg/dL      Comment:      Desirable:       <200 mg/dl    Triglycerides 92 <150 mg/dL    HDL Cholesterol 51 >39 mg/dL    LDL Cholesterol Calculated 135 (H) <100 mg/dL      Comment:      Above desirable:  100-129 mg/dl  Borderline High:  130-159 mg/dL  High:             160-189 mg/dL  Very high:       >189 mg/dl      Non HDL Cholesterol 153 (H) <130 mg/dL      Comment:      Above Desirable:  130-159 mg/dl  Borderline high:  160-189 mg/dl  High:             190-219 mg/dl  Very high:       >219 mg/dl         If you have any concerns about these  results please call and leave a message for me or send a MyChart message to the clinic.    Sincerely,    Tanner Sierra MD

## 2021-03-01 NOTE — PROGRESS NOTES
"  Child & Teen Check Up Year 18-20         Health History       Growth Percentile:    Wt Readings from Last 3 Encounters:   03/01/21 103.4 kg (228 lb)   10/30/20 102.1 kg (225 lb)   03/06/20 104.4 kg (230 lb 3.2 oz) (98 %, Z= 2.04)*     * Growth percentiles are based on [facility] (Boys, 2-20 Years) data.      Ht Readings from Last 2 Encounters:   03/01/21 1.651 m (5' 5\")   10/30/20 1.651 m (5' 5\")    Facility age limit for growth percentiles is 20 years.    Visit Vitals: /85   Pulse 87   Temp 98.1  F (36.7  C) (Oral)   Resp 24   Ht 1.651 m (5' 5\")   Wt 103.4 kg (228 lb)   SpO2 96%   BMI 37.94 kg/m    BP Percentile: Growth percentile Smart[employer] can only be used for patients less than 20 years old.    Informant: Patient, caregiver Rich    Patient, Family speaks English and so an  was not used.  Family History:   Family History   Problem Relation Age of Onset     Diabetes Father      Coronary Artery Disease No family hx of      Hypertension No family hx of      Hyperlipidemia No family hx of      Cerebrovascular Disease No family hx of      Breast Cancer No family hx of      Colon Cancer No family hx of      Prostate Cancer No family hx of      Glaucoma No family hx of      Macular Degeneration No family hx of        Dyslipidemia Screening:  Pediatric hyperlipidemia risk factors discussed today: Parents with triglycerides >240 and Elevated BMI >85th percentile  Lipid screening performed (recommended if any risk factors): Yes    Social History:     Did the family/guardian worry about wether their food would run out before they got money to buy more? No  Did the family/guardian find that the food they bought didn't last long enough and they didn't have money to get more?  No    Social History     Socioeconomic History     Marital status: Single     Spouse name: None     Number of children: None     Years of education: None     Highest education level: None   Occupational History     None   Social " Needs     Financial resource strain: None     Food insecurity     Worry: None     Inability: None     Transportation needs     Medical: None     Non-medical: None   Tobacco Use     Smoking status: Former Smoker     Packs/day: 0.50     Smokeless tobacco: Never Used   Substance and Sexual Activity     Alcohol use: Yes     Comment: sometimes, denies weekly use     Drug use: No     Sexual activity: Never   Lifestyle     Physical activity     Days per week: None     Minutes per session: None     Stress: None   Relationships     Social connections     Talks on phone: None     Gets together: None     Attends [employer]alian service: None     Active member of club or organization: None     Attends meetings of clubs or organizations: None     Relationship status: None     Intimate partner violence     Fear of current or ex partner: None     Emotionally abused: None     Physically abused: None     Forced sexual activity: None   Other Topics Concern     None   Social History Narrative     None         Medical History:   Past Medical History:   Diagnosis Date     ADD (attention deficit disorder)      Anxiety      Depressive disorder      Hypertension        Family History and past Medical History reviewed and unchanged/updated.      Parental/or patient concerns: no concerns    Daily Activities:    Nutrition:    Describe intake: [employer],     Environmental Risks:  TB exposure: No  Guns in house:None    STI Screening:  STI (including HIV) risk behaviors discussed today: No  HIV Screening (required once between ages 15-18 yrs): Previously completed  Other STI screening preformed (recommended if risk factors): No    Dental:  Have you been to a dentist this year? No-Verbal referral made  for dental check-up       Mental Health:  Teen Screen Discussed?: No        HEADSSS SCREENING:    HOME  Do you get along with your parents/siblings? Details: lives in group home  Do you have at least one adult you can really talk to? Yes    EDUCATION  Do you  "have career or college plans after high school? GED in progress    ACTIVITIES  Do you get some exercise at least 3 times a week? Details: on occassion  Do you feel you are about the right weight for your height? No    DRUGS  Do you smoke cigarettes or chew tobacco? No  Do you drink alcohol or use any type of drugs? No    SEX  Have you ever had sex? No    SUICIDE/DEPRESSION  Do you ever feel down or depressed? No    SAFETY  Do you feel afraid in any of your relationships? No  Nutrition:  Healthy between-meal snacks, Safety:  Alcohol/drugs/tobacco use. and Guidance:  Stress, nervousness, sadness.       ROS   GENERAL: no recent fevers and activity level has been normal  SKIN: Negative for rash, birthmarks, acne, pigmentation changes  HEENT: Negative for hearing problems, vision problems, nasal congestion, eye discharge and eye redness  RESP: No cough, wheezing, difficulty breathing  CV: No cyanosis, fatigue with feeding  GI: Normal stools for age, no diarrhea or constipation   : Normal urination, no disharge or painful urination  MS: No swelling, muscle weakness, joint problems  NEURO: Moves all extremeties normally, normal activity for age  ALLERGY/IMMUNE: See allergy in history         Physical Exam:   /85   Pulse 87   Temp 98.1  F (36.7  C) (Oral)   Resp 24   Ht 1.651 m (5' 5\")   Wt 103.4 kg (228 lb)   SpO2 96%   BMI 37.94 kg/m       GENERAL: Alert, well nourished, well developed, no acute distress, interacts appropriately for age  SKIN: skin is clear, no rash, acne, abnormal pigmentation or lesions  HEAD: The head is normocephalic.  EYES:The conjunctivae and cornea normal. PERRL, EOMI, Light reflex is symmetric and no eye movement on cover/uncover test. Sharp optic discs  EARS: The external auditory canals are clear and the tympanic membranes are normal; gray and transluscent.  NOSE: Clear, no discharge or congestion  MOUTH/THROAT: The throat is clear, tonsils:normal, no exudate or lesions. Normal " teeth without obvious abnormalities  NECK: The neck is supple and thyroid is normal, no masses  LYMPH NODES: No adenopathy  LUNGS: The lung fields are clear to auscultation,no rales, rhonchi, wheezing or retractions  HEART: The precordium is quiet. Rhythm is regular. S1 and S2 are normal. No murmurs.  ABDOMEN: The bowel sounds are normal. Abdomen soft, non tender,  non distended, no masses or hepatosplenomegaly.  M-GENITALIA: Normal male external genitalia. Tomás stage  5,  Testes descended bilateraly, no hernia or hydrocele. Circumcised: Yes  M-BREASTS: Normal, no gynecomastia or abnormalities  EXTREMITIES: Symmetric extremities, FROM, no deformities. Spine is straight, no scoliosis  NEUROLOGIC: No focal findings. Cranial nerves grossly intact: DTR's normal. Normal gait, strength and tone         Assessment and Plan   Reason for Visit:   Chief Complaint   Patient presents with     Well Child     no concerns     Additional Diagnoses: Obesity-patient reports that he would like to lose weight though he does not want to change his diet he is interested in starting to exercise maybe once a week.  We discussed that this is unlikely to be successful and if he were interested in in changing his diet could consider referring to a dietitian or working with him.  Schizophrenia spectrum disorder-seeing psychiatrist is currently stable and is a little improved mood.    Immunizations:    Hx immunization reactions?  No  Immunization schedule reviewed: Yes:  Following immunizations advised:  Tdap (if not given when entering 7th grade) Up to date for this immunization  Influenza if in season:Up to date for this immunization  Meningococcal (MCV) (If given before age 16 needs a booster at 16+ yo Up to date for this immunization  HPV Vaccine (Gardasil)  recommended for all at age 11 years: Up to date for this immunization    Labs:  Urinalysis: once between ages 12 and 20   Hemoglobin: once for menstruating adolescents between ages 12  and 20     Schedule next visit in 1 years    Tanner Sierra MD

## 2021-05-13 DIAGNOSIS — J30.1 NON-SEASONAL ALLERGIC RHINITIS DUE TO POLLEN: ICD-10-CM

## 2021-05-13 DIAGNOSIS — F29 SCHIZOPHRENIA SPECTRUM DISORDER WITH PSYCHOTIC DISORDER TYPE NOT YET DETERMINED (H): ICD-10-CM

## 2021-05-13 RX ORDER — LORATADINE 10 MG/1
10 TABLET ORAL DAILY
Qty: 90 TABLET | Refills: 3 | Status: SHIPPED | OUTPATIENT
Start: 2021-05-13 | End: 2022-04-20

## 2021-08-17 ENCOUNTER — OFFICE VISIT (OUTPATIENT)
Dept: FAMILY MEDICINE | Facility: CLINIC | Age: 21
End: 2021-08-17
Payer: MEDICARE

## 2021-08-17 VITALS
SYSTOLIC BLOOD PRESSURE: 125 MMHG | OXYGEN SATURATION: 98 % | WEIGHT: 225.2 LBS | HEART RATE: 93 BPM | TEMPERATURE: 98.1 F | HEIGHT: 65 IN | BODY MASS INDEX: 37.52 KG/M2 | DIASTOLIC BLOOD PRESSURE: 86 MMHG | RESPIRATION RATE: 16 BRPM

## 2021-08-17 DIAGNOSIS — N52.2 DRUG-INDUCED ERECTILE DYSFUNCTION: Primary | ICD-10-CM

## 2021-08-17 DIAGNOSIS — F51.01 PRIMARY INSOMNIA: ICD-10-CM

## 2021-08-17 PROCEDURE — 99214 OFFICE O/P EST MOD 30 MIN: CPT | Performed by: FAMILY MEDICINE

## 2021-08-17 ASSESSMENT — MIFFLIN-ST. JEOR: SCORE: 1953.38

## 2021-08-17 NOTE — PROGRESS NOTES
"  Assessment & Plan     (N52.2) Drug-induced erectile dysfunction (primary encounter diagnosis):  Patient reports lack of morning erections and decreased libido. Taking several medications associated with these side effects including Propranolol and Zoloft, which could be contributing. Elevated BMI and decreased physical activity likely also a component. Patient endorses smoking 7 cigarettes/day and pre-contemplative towards cessation- do not suspect this is the primary cause of his ED given other likely culprits.    - Advised patient to discuss changing medications/doses with psychiatrist. Has upcoming appointment scheduled.   - Encouraged physical activity. Patient set goal of 15-30 minutes cardio 3x weekly.   - Discussed smoking cessation. Patient will consider when ready.   - Patient will consider diet changes when ready    (F51.01) Primary insomnia: Medication refill provided.   - Melatonin 5 MG tablet    34 minutes spent on visit. Time spent doing chart review, history and exam, documentation and further activities per the [id number]}     BMI:   Estimated body mass index is 37.48 kg/m  as calculated from the following:    Height as of this encounter: 1.651 m (5' 5\").    Weight as of this encounter: 102.2 kg (225 lb 3.2 oz).   Weight management plan: Discussed healthy diet and exercise guidelines    Return in about 3 months (around 11/17/2021).    Savana Salinas, MS4    Tanner Sierra MD  [facility] is a 21 year old with past medical history significant for schizophrenia, RACHEL, episodic mood disorder, ADHD, and insomnia who is accompanied by his caretaker and presents for the following health issues:     HPI     Erectile Dysfunction  - Reports that he no longer has morning erections   - Has partial erections at times. Not sexually active and does not masturbate   - Low interest in sexual activity or masturbation   - Patient associates symptoms with starting " "Propranolol and Zoloft in 2018-19. Started while he was hospitalized   - Reports that his father had the same problem   - Concerned about this because he wants to have kids in the future and worried that this would make that harder   - Smokes 7 cigarettes per day. Not interested in quitting at this time. Likes the way they make him feel.   - No alcohol use   - No other recreational drugs   - No CP/SOB, fevers, difficulty urinating, dysuria, hematuria     Mood  - Reports mood has been good overall   - Lives in group home. States that he misses his family at times. Sees them occasionally and messages them on [place]   - Denies feeling depressed or hopeless   - Sleeping about 10 hours per night   - Denies SI/HI     Refills  - Needs refill of melatonin     Review of Systems   Constitutional, HEENT, cardiovascular, pulmonary, GI and  systems are negative, except as otherwise noted.      Objective    /86   Pulse 93   Temp 98.1  F (36.7  C) (Oral)   Resp 16   Ht 1.651 m (5' 5\")   Wt 102.2 kg (225 lb 3.2 oz)   SpO2 98%   BMI 37.48 kg/m    Body mass index is 37.48 kg/m .  Physical Exam   GENERAL: healthy, alert and no distress  RESP: lungs clear to auscultation - no rales, rhonchi or wheezes  CV: regular rate and rhythm, normal S1 S2, no S3 or S4, no murmur, click or rub  ABDOMEN: soft, nontender, no hepatosplenomegaly, no masses and bowel sounds normal  : Deferred- patient declined exam.   MS: no gross musculoskeletal defects noted, no edema    ----- Services Performed by a MEDICAL STUDENT in Presence of ATTENDING Physician-------        I was present with the medical student who participated in the service and in the documentation of this note. I have verified the history and personally performed the physical exam and medical decision making, and have verified the content of the note, which accurately reflects my assessment of the patient and the plan of care.   Tanner Sierra MD                  "

## 2021-08-17 NOTE — PATIENT INSTRUCTIONS
Here is the plan from today's visit    1. Primary insomnia  Refill provided.   - melatonin 5 MG tablet; Take 1 tablet (5 mg) by mouth At Bedtime  Dispense: 90 tablet; Refill: 3    2. Drug-induced erectile dysfunction  - Consider talk to psychiatrist about changing medication/doses of Propanolol or Zoloft.   - Goal of increasing exercise to 3x weekly, 15-30 minutes of running/elliptical.   - Consider stopping smoking when ready.   - Consider diet changes when ready.     Please call or return to clinic if your symptoms don't go away.    Follow up plan  Return in about 3 months (around 11/17/2021).     Thank you for coming to [facility] today.  Lab Testing:  **If you had lab testing today and your results are reassuring or normal they will be mailed to you or sent through [facility] within 7 days.   **If the lab tests need quick action we will call you with the results.  The phone number we will call with results is # [phone] (home) . If this is not the best number please call our clinic and change the number.  Medication Refills:  If you need any refills please call your pharmacy and they will contact us.   If you need to  your refill at a new pharmacy, please contact the new pharmacy directly. The new pharmacy will help you get your medications transferred faster.   Scheduling:  If you have any concerns about today's visit or wish to schedule another appointment please call our office during normal business hours [phone] (8-5:00 M-F)   eferrals to [facility] please call [phone].   Mammogram Scheduling [phone]     XRay/CT/Ultrasound/MRI Scheduling [phone]    Medical Concerns:  If you have urgent medical concerns please call [phone] at any time of the day.    Tanner Sierra MD

## 2021-10-15 NOTE — TELEPHONE ENCOUNTER

## 2021-12-08 ENCOUNTER — TELEPHONE (OUTPATIENT)
Dept: FAMILY MEDICINE | Facility: CLINIC | Age: 21
End: 2021-12-08

## 2021-12-08 NOTE — TELEPHONE ENCOUNTER
[facility] phone call message - order or referral request for patient:     Order or referral being requested: Patient called in requesting a letter to remove parents as his legal guardians. Patient contacted their psychiatrist who declined writing letter and told patient to contact PCP. Patient declined an appointment to discuss.      Additional Comments: na    OK to leave a message on voice mail? No    Primary language: English      needed? No    Call taken on December 8, 2021 at 1:47 PM by Molly Echols CMA

## 2021-12-08 NOTE — TELEPHONE ENCOUNTER
Refill sent Tried calling the RN line; patient asked for another call back, if possible. [phone]. Thanks.

## 2021-12-08 NOTE — TELEPHONE ENCOUNTER
RN attempted to reach patient to discuss- LM on both numbers to contact clinic. Please transfer to any available RN when patient calls back.   Katya Pereira, RN      Will also route to PCP in the meantime and update note if he calls back.

## 2021-12-09 NOTE — TELEPHONE ENCOUNTER
Patient reports that his court date to become his own guardian currently scheduled for Dec 30th, would like to meet with provider before then. RN scheduled with Dr. Sierra for 12/17/21, patient will confirm with his staff that he can get a ride to [place]. Appointment scheduled for now. Routing to PCP and SW/CC as FYI.     Jovanna Soto RN

## 2021-12-09 NOTE — TELEPHONE ENCOUNTER
RN attempted to reach patient again-LM with call back. When patient calls back, please relay message from Dr. Sierra below that he will need an appointment to discuss this with provider. Please assist with scheduling. Okay to transfer if he has questions.   Katya Pereira, RN

## 2021-12-27 ENCOUNTER — IMMUNIZATION (OUTPATIENT)
Dept: FAMILY MEDICINE | Facility: CLINIC | Age: 21
End: 2021-12-27
Payer: MEDICARE

## 2021-12-27 DIAGNOSIS — Z23 HIGH PRIORITY FOR 2019 NOVEL CORONAVIRUS VACCINATION: Primary | ICD-10-CM

## 2021-12-27 PROCEDURE — 91306 COVID-19,PF,MODERNA (18+ YRS BOOSTER .25ML): CPT

## 2021-12-27 PROCEDURE — G0008 ADMIN INFLUENZA VIRUS VAC: HCPCS

## 2021-12-27 PROCEDURE — 0064A COVID-19,PF,MODERNA (18+ YRS BOOSTER .25ML): CPT

## 2021-12-27 PROCEDURE — 90686 IIV4 VACC NO PRSV 0.5 ML IM: CPT

## 2022-01-18 ENCOUNTER — VIRTUAL VISIT (OUTPATIENT)
Dept: FAMILY MEDICINE | Facility: CLINIC | Age: 22
End: 2022-01-18
Payer: MEDICARE

## 2022-01-18 DIAGNOSIS — F20.0 SCHIZOPHRENIA, PARANOID TYPE (H): Primary | ICD-10-CM

## 2022-01-18 PROCEDURE — 99214 OFFICE O/P EST MOD 30 MIN: CPT | Mod: 95 | Performed by: FAMILY MEDICINE

## 2022-01-18 NOTE — PROGRESS NOTES
"[facility] Progress Note    Dre is a 21 year old who is being evaluated via a billable video visit.      How would you like to obtain your AVS? Mail a copy  If the video visit is dropped, the invitation should be resent by: Text to cell phone: to email on file  Will anyone else be joining your video visit? No      Video Start Time: 2:30 PM      Assessment & Plan     Schizophrenia, paranoid type (H)  Patient is currently under guardianship of the state. I reported to him that in order for me to write a letter supporting ending his guardianship that I would need to see CM working more than half time for at least 6 months. And that I would need to hear report from the group home caregiver Rich and also his psychiatrist. I would not be willing to sign a letter regarding his guardianship without these conditions patient expressed understanding of the situation and disappointment.        I spent a total of 33 minutes on the day of the visit.   Time spent doing chart review, history and exam, documentation and further activities per the note           Return in about 3 months (around 4/18/2022) for CPE/Wellness Visit.    Tanner Sierra MD  [facility] WILMER Erickson is a 21 year old who presents for the following health issues   Patient presents with:  Forms: Guardianship         HPI   Currently under guardianship of the state. Patient has a conservator named Kamla Bliss -Conservator  Reports that he has a job for on Mondays for 5 hours a week-is currently working on his [employer].   Has filed a petition with the court to become his own guardian. He reports to me that when he discussed this with his psychiatrist Dr Wesley SCHUMACHER in [facility] she advised him to talk to his primary care physician.  When I asked him why he wants to do this he says he \" I want to go back and live with my mom. I want to help pay for groceries and be there for my family.\"    He has been on numerous " psychoactive medications and continues to be on numerous psychoactive medications. He has a diagnosis of a schizophrenia spectrum disorder with psychotic disorder. And Abbi also has a diagnosis of borderline intellectual functioning. He presents today without his caregiver Rich who has been working with him for some time. None of this history is corroborated.    I contacted the manager of the Group home, Rich. Rich reports that Dre has been working on his GED for 2 years and has not completed a course yet and has h recently abandoned it. Rich reports that his psychiatrist declined to complete the letter and so he asked me.       Review of Systems         Objective           Vitals:  No vitals were obtained today due to virtual visit.    Physical Exam   GENERAL: Healthy, alert and no distress  EYES: Eyes grossly normal to inspection.  No discharge or erythema, or obvious scleral/conjunctival abnormalities.  RESP: No audible wheeze, cough, or visible cyanosis.  No visible retractions or increased work of breathing.    SKIN: Visible skin clear. No significant rash, abnormal pigmentation or lesions.  NEURO: Cranial nerves grossly intact.  Mentation and speech appropriate for age.  PSYCH: Mentation appears normal, affect normal/bright, judgement and insight intact, normal speech and appearance well-groomed.  PSYCH: mentation appears normal, tangential, affect flat, judgement and insight impaired. At times he exhibited unusual behavior such as when he wanted to show me his stuffed lion which she has had since childhood.                Video-Visit Details    Type of service:  Video Visit    Video End Time:3 pm    Originating Location (pt. Location): Other Group home which is his current residence    Distant Location (provider location):  [facility]     Platform used for Video Visit: Noveporter

## 2022-01-18 NOTE — PROGRESS NOTES
Unable to reach patient with number provided in appointment notes.Patient notified via detailed message on voicemail box stating Provider is ready to get started on video visit. Patient aware to call the clinic back to start visit.    Shannon Zavala MA    2nd Attempt to reach patient with Cell/Home number on file with no answer. Patient notified via detailed message to call the clinic back to get started on video visit.

## 2022-01-19 PROBLEM — G90.81 SEROTONIN SYNDROME: Status: RESOLVED | Noted: 2018-10-08 | Resolved: 2022-01-19

## 2022-02-02 ENCOUNTER — TELEPHONE (OUTPATIENT)
Dept: FAMILY MEDICINE | Facility: CLINIC | Age: 22
End: 2022-02-02
Payer: MEDICARE

## 2022-02-02 NOTE — TELEPHONE ENCOUNTER
[facility] phone call message- patient requesting to speak with PCP or provider:    PCP: Tanner Sierra    Additional Comments: Patient called to ask about his guardianship and I wasn't sure what I can and cannot share with him based on the guardianship. Please call him back at [phone].       Is a call back needed? Yes    Patient informed that it may take up to 2 business days to hear back from PCP:Yes    OK to leave a message on voice mail? Yes    Primary language: English      needed? No    Call taken on February 2, 2022 at 3:02 PM by Kristina Cobos

## 2022-02-04 NOTE — TELEPHONE ENCOUNTER
Social Work Phone Call    2022   3:40 PM    Data/Intervention:  Patient Name:  Dre Mcdaniel   /Age:  2000 (21 year old)    Spoke with: Dre    Reason for Call: Return Call     Assessment/ Plan:  TAL reiterated what Dr. Sierra had stated in his previous visit. Dre had no further questions at this time.     LOBITO Holley  Pronouns: She/Her/Hers  , Care Coordination  [facility]  [phone]

## 2022-02-04 NOTE — TELEPHONE ENCOUNTER
Patient called back to see if there were any updates on his request and to also wanted to disclose that his (the patient) court date is Thursday February 10th, 2022.

## 2022-04-19 DIAGNOSIS — J30.1 NON-SEASONAL ALLERGIC RHINITIS DUE TO POLLEN: ICD-10-CM

## 2022-04-19 DIAGNOSIS — F29 SCHIZOPHRENIA SPECTRUM DISORDER WITH PSYCHOTIC DISORDER TYPE NOT YET DETERMINED (H): ICD-10-CM

## 2022-04-19 NOTE — TELEPHONE ENCOUNTER
"Request for medication refill:  loratadine (CLARITIN) 10 MG tablet    Providers if patient needs an appointment and you are willing to give a one month supply please refill for one month and  send a letter/MyChart using \".SMILLIMITEDREFILL\" .smillimited and route chart to \"P [facility] \" (Giving one month refill in non controlled medications is strongly recommended before denial)    If refill has been denied, meaning absolutely no refills without visit, please complete the smart phrase \".smirxrefuse\" and route it to the \"P [facility] MED REFILLS\"  pool to inform the patient and the pharmacy.    Carmela Holden MA        "

## 2022-04-20 RX ORDER — LORATADINE 10 MG/1
10 TABLET ORAL DAILY
Qty: 90 TABLET | Refills: 3 | Status: SHIPPED | OUTPATIENT
Start: 2022-04-20 | End: 2023-03-14

## 2022-06-03 ENCOUNTER — OFFICE VISIT (OUTPATIENT)
Dept: FAMILY MEDICINE | Facility: CLINIC | Age: 22
End: 2022-06-03
Payer: MEDICARE

## 2022-06-03 VITALS
HEIGHT: 65 IN | OXYGEN SATURATION: 98 % | HEART RATE: 92 BPM | SYSTOLIC BLOOD PRESSURE: 114 MMHG | WEIGHT: 221.4 LBS | TEMPERATURE: 98.5 F | BODY MASS INDEX: 36.89 KG/M2 | RESPIRATION RATE: 16 BRPM | DIASTOLIC BLOOD PRESSURE: 75 MMHG

## 2022-06-03 DIAGNOSIS — Z00.00 ROUTINE GENERAL MEDICAL EXAMINATION AT A HEALTH CARE FACILITY: Primary | ICD-10-CM

## 2022-06-03 LAB
CHOLEST SERPL-MCNC: 198 MG/DL
FASTING STATUS PATIENT QL REPORTED: ABNORMAL
HBA1C MFR BLD: 5.2 % (ref 0–5.6)
HDLC SERPL-MCNC: 42 MG/DL
LDLC SERPL CALC-MCNC: 135 MG/DL
NONHDLC SERPL-MCNC: 156 MG/DL
TRIGL SERPL-MCNC: 105 MG/DL

## 2022-06-03 PROCEDURE — 83036 HEMOGLOBIN GLYCOSYLATED A1C: CPT | Performed by: STUDENT IN AN ORGANIZED HEALTH CARE EDUCATION/TRAINING PROGRAM

## 2022-06-03 PROCEDURE — 36415 COLL VENOUS BLD VENIPUNCTURE: CPT | Performed by: STUDENT IN AN ORGANIZED HEALTH CARE EDUCATION/TRAINING PROGRAM

## 2022-06-03 PROCEDURE — 86481 TB AG RESPONSE T-CELL SUSP: CPT | Performed by: STUDENT IN AN ORGANIZED HEALTH CARE EDUCATION/TRAINING PROGRAM

## 2022-06-03 PROCEDURE — 99213 OFFICE O/P EST LOW 20 MIN: CPT | Mod: GC | Performed by: STUDENT IN AN ORGANIZED HEALTH CARE EDUCATION/TRAINING PROGRAM

## 2022-06-03 PROCEDURE — 80061 LIPID PANEL: CPT | Performed by: STUDENT IN AN ORGANIZED HEALTH CARE EDUCATION/TRAINING PROGRAM

## 2022-06-03 NOTE — PROGRESS NOTES
Male Physical Note          HPI         Concerns today: No special concerns today.    Patient resides in a group home and is accompanied by a caregiver from the group home.     Patient Active Problem List   Diagnosis     Episodic mood disorder (H)     Attention deficit disorder (ADD) without hyperactivity     Schizophrenia spectrum disorder with psychotic disorder type not yet determined (H)     Borderline intellectual functioning     Generalized anxiety disorder     Insomnia     Schizophrenia, paranoid type (H)     Class 1 obesity due to excess calories without serious comorbidity with body mass index (BMI) of 34.0 to 34.9 in adult       Past Medical History:   Diagnosis Date     ADD (attention deficit disorder)      Aggression 12/3/2014     Anxiety      Depressive disorder      Hypertension      NMS (neuroleptic malignant syndrome) 10/7/2018     Suicidal ideation 8/20/2018     Tachycardia 10/8/2018       Previous Medical Care      Family History   Problem Relation Age of Onset     Diabetes Father      Coronary Artery Disease No family hx of      Hypertension No family hx of      Hyperlipidemia No family hx of      Cerebrovascular Disease No family hx of      Breast Cancer No family hx of      Colon Cancer No family hx of      Prostate Cancer No family hx of      Glaucoma No family hx of      Macular Degeneration No family hx of               Review of Systems:     Review of Systems:  CONSTITUTIONAL: NEGATIVE for fever, chills, change in weight  INTEGUMENTARY/SKIN: NEGATIVE for worrisome rashes, moles or lesions  EYES: NEGATIVE for vision changes or irritation  ENT/MOUTH: NEGATIVE for ear, mouth and throat problems  RESP: NEGATIVE for significant cough or SOB  BREAST: NEGATIVE for masses, tenderness or discharge  CV: NEGATIVE for chest pain, palpitations or peripheral edema  GI: NEGATIVE for nausea, abdominal pain, heartburn, or change in bowel habits  : NEGATIVE for frequency, dysuria, or  "hematuria  MUSCULOSKELETAL: NEGATIVE for significant arthralgias or myalgia  NEURO: NEGATIVE for weakness, dizziness or paresthesias  ENDOCRINE: NEGATIVE for temperature intolerance, skin/hair changes  HEME/ALLERGY: NEGATIVE for bleeding problems  PSYCHIATRIC: NEGATIVE for changes in mood or affect  Sleep:   Do you snore most or the night (as reported by a family member)? No  Do you feel sleepy or extremely tired during most of the day? No           Social History     Social History     Socioeconomic History     Marital status: Single     Spouse name: Not on file     Number of children: Not on file     Years of education: Not on file     Highest education level: Not on file   Occupational History     Not on file   Tobacco Use     Smoking status: Former Smoker     Packs/day: 0.50     Smokeless tobacco: Never Used   Substance and Sexual Activity     Alcohol use: Yes     Comment: sometimes, denies weekly use     Drug use: No     Sexual activity: Never   Other Topics Concern     Not on file   Social History Narrative     Not on file     Social Determinants of Health     Financial Resource Strain: Not on file   Food Insecurity: Not on file   Transportation Needs: Not on file   Physical Activity: Not on file   Stress: Not on file   Social Connections: Not on file   Intimate Partner Violence: Not on file   Housing Stability: Not on file       Marital Status:Single  Who lives in your household? Group home    Has anyone hurt you physically, for example by pushing, hitting, slapping or kicking you or forcing you to have sex? Denies  Do you feel threatened or controlled by a partner, ex-partner or anyone in your life? Denies    Sexual Health     Sexual concerns: No   STI History: Neg      Recommended Screening     Lipid panel and A1C due to psychiatric medications.          Physical Exam:     Vitals: /75   Pulse 92   Temp 98.5  F (36.9  C) (Oral)   Resp 16   Ht 1.65 m (5' 4.96\")   Wt 100.4 kg (221 lb 6.4 oz)   SpO2 " 98%   BMI 36.89 kg/m    BMI= Body mass index is 36.89 kg/m .  GENERAL: healthy, alert and no distress  EYES: Eyes grossly normal to inspection, extraocular movements - intact, and PERRL  HENT: ear canals- normal; TMs- normal; Nose- normal; Mouth- no ulcers, no lesions  NECK: no tenderness, no adenopathy, no asymmetry, no masses, no stiffness; thyroid- normal to palpation  RESP: lungs clear to auscultation - no rales, no rhonchi, no wheezes  CV: regular rates and rhythm, normal S1 S2, no S3 or S4 and no murmur, no click or rub -  ABDOMEN: soft, no tenderness, no  hepatosplenomegaly, no masses, normal bowel sounds  MS: extremities- no gross deformities noted, no edema  SKIN: no suspicious lesions, no rashes  NEURO: strength and tone- normal, sensory exam- grossly normal, mentation- intact, speech- normal  BACK: no CVA tenderness, no paralumbar tenderness  PSYCH: Alert and oriented times 3; speech- coherent , normal rate and volume; able to articulate logical thoughts, able to abstract reason, no tangential thoughts, no hallucinations or delusions, affect- normal  LYMPHATICS: ant. cervical- normal, post. cervical- normal, axillary- normal, supraclavicular- normal    Assessment and Plan      Dre was seen today for physical and forms.    Diagnoses and all orders for this visit:    Routine general medical examination at a health care facility  -     Quantiferon TB Gold Plus; Future  -     Hemoglobin A1c; Future  -     Lipid panel; Future  -     Quantiferon TB Gold Plus  -     Hemoglobin A1c  -     Lipid panel      Options for treatment and follow-up care were reviewed with the patient. Dre Mcdaniel Jr. and/or guardian engaged in the decision making process and verbalized understanding of the options discussed and agreed with the final plan.    Babs Kaiser MD

## 2022-06-06 LAB
GAMMA INTERFERON BACKGROUND BLD IA-ACNC: 0.03 IU/ML
M TB IFN-G BLD-IMP: NEGATIVE
M TB IFN-G CD4+ BCKGRND COR BLD-ACNC: 8.96 IU/ML
MITOGEN IGNF BCKGRD COR BLD-ACNC: 0 IU/ML
MITOGEN IGNF BCKGRD COR BLD-ACNC: 0 IU/ML
QUANTIFERON MITOGEN: 8.99 IU/ML
QUANTIFERON NIL TUBE: 0.03 IU/ML
QUANTIFERON TB1 TUBE: 0.03 IU/ML
QUANTIFERON TB2 TUBE: 0.03

## 2022-07-15 DIAGNOSIS — F51.01 PRIMARY INSOMNIA: ICD-10-CM

## 2022-07-15 NOTE — TELEPHONE ENCOUNTER
"Request for medication refill:  melatonin 5 MG tablet  Providers if patient needs an appointment and you are willing to give a one month supply please refill for one month and  send a letter/MyChart using \".SMILLIMITEDREFILL\" .smillimited and route chart to \"P [facility] \" (Giving one month refill in non controlled medications is strongly recommended before denial)    If refill has been denied, meaning absolutely no refills without visit, please complete the smart phrase \".smirxrefuse\" and route it to the \"P [facility] MED REFILLS\"  pool to inform the patient and the pharmacy.    Adolph Goldstein MA        "

## 2023-03-14 DIAGNOSIS — J30.1 NON-SEASONAL ALLERGIC RHINITIS DUE TO POLLEN: ICD-10-CM

## 2023-03-14 DIAGNOSIS — F29 SCHIZOPHRENIA SPECTRUM DISORDER WITH PSYCHOTIC DISORDER TYPE NOT YET DETERMINED (H): ICD-10-CM

## 2023-03-14 RX ORDER — LORATADINE 10 MG/1
TABLET ORAL
Qty: 90 TABLET | Refills: 3 | Status: SHIPPED | OUTPATIENT
Start: 2023-03-14 | End: 2023-06-09

## 2023-03-14 NOTE — TELEPHONE ENCOUNTER
"Request for medication refill:  loratadine (CLARITIN) 10 MG tablet    Providers if patient needs an appointment and you are willing to give a one month supply please refill for one month and  send a letter/MyChart using \".SMILLIMITEDREFILL\" .smillimited and route chart to \"P [facility] \" (Giving one month refill in non controlled medications is strongly recommended before denial)    If refill has been denied, meaning absolutely no refills without visit, please complete the smart phrase \".smirxrefuse\" and route it to the \"P [facility] MED REFILLS\"  pool to inform the patient and the pharmacy.    Adolph Goldstein MA        "

## 2023-04-15 ENCOUNTER — HEALTH MAINTENANCE LETTER (OUTPATIENT)
Age: 23
End: 2023-04-15

## 2023-06-06 DIAGNOSIS — F51.01 PRIMARY INSOMNIA: ICD-10-CM

## 2023-06-06 NOTE — TELEPHONE ENCOUNTER
"Last Visit 6/3/22    Request for medication refill:  melatonin 5 MG tablet  Providers if patient needs an appointment and you are willing to give a one month supply please refill for one month and  send a letter/MyChart using \".SMILLIMITEDREFILL\" .smillimited and route chart to \"P [facility] \" (Giving one month refill in non controlled medications is strongly recommended before denial)    If refill has been denied, meaning absolutely no refills without visit, please complete the smart phrase \".smirxrefuse\" and route it to the \"P [facility] MED REFILLS\"  pool to inform the patient and the pharmacy.    Judy Posey MA        "

## 2023-06-09 ENCOUNTER — OFFICE VISIT (OUTPATIENT)
Dept: FAMILY MEDICINE | Facility: CLINIC | Age: 23
End: 2023-06-09
Payer: MEDICARE

## 2023-06-09 VITALS
BODY MASS INDEX: 40.02 KG/M2 | WEIGHT: 240.2 LBS | HEIGHT: 65 IN | RESPIRATION RATE: 16 BRPM | DIASTOLIC BLOOD PRESSURE: 79 MMHG | OXYGEN SATURATION: 97 % | SYSTOLIC BLOOD PRESSURE: 115 MMHG | HEART RATE: 79 BPM

## 2023-06-09 DIAGNOSIS — Z13.9 SCREENING FOR CONDITION: ICD-10-CM

## 2023-06-09 DIAGNOSIS — E66.01 CLASS 3 SEVERE OBESITY DUE TO EXCESS CALORIES WITHOUT SERIOUS COMORBIDITY WITH BODY MASS INDEX (BMI) OF 40.0 TO 44.9 IN ADULT (H): ICD-10-CM

## 2023-06-09 DIAGNOSIS — J30.1 NON-SEASONAL ALLERGIC RHINITIS DUE TO POLLEN: ICD-10-CM

## 2023-06-09 DIAGNOSIS — F51.01 PRIMARY INSOMNIA: ICD-10-CM

## 2023-06-09 DIAGNOSIS — Z00.00 ROUTINE GENERAL MEDICAL EXAMINATION AT A HEALTH CARE FACILITY: ICD-10-CM

## 2023-06-09 DIAGNOSIS — E66.813 CLASS 3 SEVERE OBESITY DUE TO EXCESS CALORIES WITHOUT SERIOUS COMORBIDITY WITH BODY MASS INDEX (BMI) OF 40.0 TO 44.9 IN ADULT (H): ICD-10-CM

## 2023-06-09 DIAGNOSIS — Z00.01 ENCOUNTER FOR ROUTINE ADULT MEDICAL EXAM WITH ABNORMAL FINDINGS: ICD-10-CM

## 2023-06-09 DIAGNOSIS — F20.0 SCHIZOPHRENIA, PARANOID TYPE (H): Primary | ICD-10-CM

## 2023-06-09 LAB
ALBUMIN SERPL BCG-MCNC: 4.3 G/DL (ref 3.5–5.2)
ALP SERPL-CCNC: 97 U/L (ref 40–129)
ALT SERPL W P-5'-P-CCNC: 34 U/L (ref 10–50)
AST SERPL W P-5'-P-CCNC: 29 U/L (ref 10–50)
BILIRUB DIRECT SERPL-MCNC: <0.2 MG/DL (ref 0–0.3)
BILIRUB SERPL-MCNC: 0.2 MG/DL
CHOLEST SERPL-MCNC: 217 MG/DL
HBA1C MFR BLD: 5.1 % (ref 0–5.6)
HDLC SERPL-MCNC: 48 MG/DL
LDLC SERPL CALC-MCNC: 150 MG/DL
NONHDLC SERPL-MCNC: 169 MG/DL
PROT SERPL-MCNC: 7.3 G/DL (ref 6.4–8.3)
TRIGL SERPL-MCNC: 97 MG/DL

## 2023-06-09 PROCEDURE — 80061 LIPID PANEL: CPT | Performed by: FAMILY MEDICINE

## 2023-06-09 PROCEDURE — 36415 COLL VENOUS BLD VENIPUNCTURE: CPT | Performed by: FAMILY MEDICINE

## 2023-06-09 PROCEDURE — 80076 HEPATIC FUNCTION PANEL: CPT | Performed by: FAMILY MEDICINE

## 2023-06-09 PROCEDURE — 99395 PREV VISIT EST AGE 18-39: CPT | Performed by: FAMILY MEDICINE

## 2023-06-09 RX ORDER — LORATADINE 10 MG/1
1 TABLET ORAL DAILY
Qty: 90 TABLET | Refills: 3 | Status: SHIPPED | OUTPATIENT
Start: 2023-06-09 | End: 2024-06-25

## 2023-06-09 ASSESSMENT — ENCOUNTER SYMPTOMS
HEMATOCHEZIA: 0
ARTHRALGIAS: 0
HEARTBURN: 0
HEMATURIA: 0
DIZZINESS: 0
NERVOUS/ANXIOUS: 1
HEADACHES: 0
WEAKNESS: 0
FREQUENCY: 1
DYSURIA: 0
ABDOMINAL PAIN: 0
CONSTIPATION: 0
COUGH: 0
DIARRHEA: 0
PALPITATIONS: 0
MYALGIAS: 0
NAUSEA: 0
EYE PAIN: 0
CHILLS: 0
JOINT SWELLING: 0
FEVER: 0
SHORTNESS OF BREATH: 0
PARESTHESIAS: 0
SORE THROAT: 0

## 2023-06-09 ASSESSMENT — ACTIVITIES OF DAILY LIVING (ADL): CURRENT_FUNCTION: NO ASSISTANCE NEEDED

## 2023-06-09 NOTE — PATIENT INSTRUCTIONS
Add spinach and bananas to diet, couple time s aweek  Try different low calorie drinks   Exercise -bike to work 3 days a week.   Come back in 4-6 weeks for a recheck.     Preventive Health Recommendations  Male Ages 21 - 25     Yearly exam:             See your health care provider every year in order to  o   Review health changes.   o   Discuss preventive care.    o   Review your medicines if your doctor has prescribed any.    You should be tested each year for STDs (sexually transmitted diseases).     Talk to your provider about cholesterol testing.      If you are at risk for diabetes, you should have a diabetes test (fasting glucose).    Shots: Get a flu shot each year. Get a tetanus shot every 10 years.     Nutrition:    Eat at least 5 servings of fruits and vegetables daily.     Eat whole-grain bread, whole-wheat pasta and brown rice instead of white grains and rice.     Get adequate calcium and Vitamin D.     Lifestyle    Exercise for at least 150 minutes a week (30 minutes a day, 5 days a week). This will help you control your weight and prevent disease.     Limit alcohol to one drink per day.     No smoking.     Wear sunscreen to prevent skin cancer.     See your dentist every six months for an exam and cleaning.

## 2023-06-09 NOTE — PROGRESS NOTES
"SUBJECTIVE:   CC: Dre is an 23 year old who presents for preventative health visit.       6/9/2023    10:59 AM   Additional Questions   Roomed by Derick   Accompanied by Provider         6/9/2023    10:59 AM   Patient Reported Additional Medications   Patient reports taking the following new medications n/a     Healthy Habits:     In general, how would you rate your overall health?  Fair    Frequency of exercise:  None    Do you usually eat at least 4 servings of fruit and vegetables a day, include whole grains    & fiber and avoid regularly eating high fat or \"junk\" foods?  No    Taking medications regularly:  Yes    Medication side effects:  Not applicable    Ability to successfully perform activities of daily living:  No assistance needed    Home Safety:  No safety concerns identified    Hearing Impairment:  No hearing concerns    In the past 6 months, have you been bothered by leaking of urine?  No    In general, how would you rate your overall mental or emotional health?  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  He is accompanied to the visit with his foster father Franko Corona  Ability to successfully perform activities of daily living: Yes, no assistance needed  Home safety:  none identified   Hearing impairment: NO,      Meri Ramirez Psychiatry [phone]   Hearing Assessment: normal    Psychiatrist          Today's PHQ-2 Score:       6/9/2023    11:04 AM   PHQ-2 ( 1999 [employer])   Q1: Little interest or pleasure in doing things 0   Q2: Feeling down, depressed or hopeless 0   PHQ-2 Score 0           Social History     Tobacco Use     Smoking status: Former     Packs/day: 0.50     Types: Cigarettes     Smokeless tobacco: Never   Vaping Use     Vaping status: Every Day   Substance Use " "Topics     Alcohol use: Yes     Comment: sometimes, denies weekly use             6/9/2023    10:40 AM   Alcohol Use   Prescreen: >3 drinks/day or >7 drinks/week? Not Applicable         Reviewed orders with patient. Reviewed health maintenance and updated orders accordingly - Yes      Reviewed and updated as needed this visit by clinical staff   Tobacco  Allergies  Meds  Problems  Med Hx  Surg Hx  Fam Hx          Reviewed and updated as needed this visit by Provider   Tobacco  Allergies  Meds  Problems  Med Hx  Surg Hx  Fam Hx             Review of Systems   Constitutional: Negative for chills and fever.   HENT: Negative for congestion, ear pain, hearing loss and sore throat.    Eyes: Negative for pain and visual disturbance.   Respiratory: Negative for cough and shortness of breath.    Cardiovascular: Negative for chest pain, palpitations and peripheral edema.   Gastrointestinal: Negative for abdominal pain, constipation, diarrhea, heartburn, hematochezia and nausea.   Genitourinary: Positive for frequency. Negative for dysuria, genital sores, hematuria and urgency.   Musculoskeletal: Negative for arthralgias, joint swelling and myalgias.   Skin: Negative for rash.   Neurological: Negative for dizziness, weakness, headaches and paresthesias.   Psychiatric/Behavioral: Negative for mood changes. The patient is nervous/anxious.          OBJECTIVE:   /79   Pulse 79   Resp 16   Ht 1.65 m (5' 4.96\")   Wt 109 kg (240 lb 3.2 oz)   SpO2 97%   BMI 40.02 kg/m      Physical Exam  Constitutional:       General: He is not in acute distress.     Appearance: He is well-developed.   HENT:      Head: Normocephalic and atraumatic.      Right Ear: External ear normal. Tympanic membrane is not injected or erythematous.      Left Ear: External ear normal. Tympanic membrane is not erythematous.      Nose: Nose normal.      Mouth/Throat:      Pharynx: Uvula midline. No oropharyngeal exudate.   Eyes:      General: " No scleral icterus.        Right eye: No discharge.         Left eye: No discharge.      Conjunctiva/sclera: Conjunctivae normal.      Right eye: No exudate.     Pupils: Pupils are equal, round, and reactive to light.   Neck:      Thyroid: No thyromegaly.      Trachea: No tracheal deviation.   Cardiovascular:      Rate and Rhythm: Normal rate and regular rhythm.      Heart sounds: Normal heart sounds. No murmur heard.  Pulmonary:      Effort: Pulmonary effort is normal. No respiratory distress.      Breath sounds: Normal breath sounds. No wheezing.   Abdominal:      General: Bowel sounds are normal.      Palpations: Abdomen is soft.      Tenderness: There is no abdominal tenderness. There is no guarding or rebound.      Hernia: There is no hernia in the left inguinal area.   Musculoskeletal:         General: No tenderness. Normal range of motion.      Cervical back: Normal range of motion and neck supple.   Lymphadenopathy:      Cervical: No cervical adenopathy.   Skin:     General: Skin is warm and dry.   Neurological:      Mental Status: He is alert and oriented to person, place, and time.      Cranial Nerves: No cranial nerve deficit.      Deep Tendon Reflexes: Reflexes are normal and symmetric.   Psychiatric:         Behavior: Behavior normal.         Thought Content: Thought content normal.         Judgment: Judgment normal.             ASSESSMENT/PLAN:   (F20.0) Schizophrenia, paranoid type (H)  (primary encounter diagnosis)  Comment: Emotionally evidence is due to doing as well as I have seen him.  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  Plan: He continues to get care from [facility] will discuss with his a psychiatric nurse petitioner about possible weight management plans.    (Z13.9) Screening for condition  Comment:   Plan: Hemoglobin A1c, Lipid panel reflex to direct         LDL  "Non-fasting            (F51.01) Primary insomnia  Comment:   Plan: melatonin 5 MG tablet       (J30.1) Non-seasonal allergic rhinitis due to pollen  Comment: Well-controlled  Plan: loratadine (CLARITIN) 10 MG tablet        Continue    (E66.01,  Z68.41) Class 3 severe obesity due to excess calories without serious comorbidity with body mass index (BMI) of 40.0 to 44.9 in adult (H)  Comment: Shyam has had significant bout making over the last 3 years his weight at age 18 was 120 he is now 240 pounds.  His psychiatric condition or possibly started metformin.  Believed to be a helpful weight loss medication though does criteria for think some type that may be of very helpful for him  Plan: So we will reach out and contact his unexpected and also will initiate diet changes and agreed to try to incorporate bananas grapes and spinach into his diet and to try to start making him to work 3 times a week and certainly explore other things to drink clear then Sprite          COUNSELING:   Reviewed preventive health counseling, as reflected in patient instructions       Regular exercise       Healthy diet/nutrition        BMI:   Estimated body mass index is 40.02 kg/m  as calculated from the following:    Height as of this encounter: 1.65 m (5' 4.96\").    Weight as of this encounter: 109 kg (240 lb 3.2 oz).   Weight management plan: Discussed healthy diet and exercise guidelines Asked patient to follow-up in 2 to 3 weeks for discussion of other options of treatment      He reports that he has quit smoking. His smoking use included cigarettes. He smoked an average of .5 packs per day. He has never used smokeless tobacco.            Tanner Sierra MD  [facility]  "

## 2023-06-15 PROBLEM — E66.01 OBESITY, CLASS III, BMI 40-49.9 (MORBID OBESITY) (H): Status: ACTIVE | Noted: 2019-10-29

## 2023-06-15 PROBLEM — E66.813 OBESITY, CLASS III, BMI 40-49.9 (MORBID OBESITY) (H): Status: ACTIVE | Noted: 2019-10-29

## 2023-08-02 ENCOUNTER — VIRTUAL VISIT (OUTPATIENT)
Dept: FAMILY MEDICINE | Facility: CLINIC | Age: 23
End: 2023-08-02
Payer: MEDICARE

## 2023-08-02 DIAGNOSIS — E66.01 OBESITY, CLASS III, BMI 40-49.9 (MORBID OBESITY) (H): ICD-10-CM

## 2023-08-02 PROCEDURE — 99213 OFFICE O/P EST LOW 20 MIN: CPT | Mod: 95 | Performed by: FAMILY MEDICINE

## 2023-08-02 NOTE — PATIENT INSTRUCTIONS
Patient Education   Here is the plan from today's visit    1. Obesity, Class III, BMI 40-49.9 (morbid obesity) (H)  Increase metformin as we talked about I would also increase the number of fruits and vegetables that you have per day up to goal of 5-6 vegetables and fruits a day.  And to limit your sweetened drinks which would include chocolate milk pop juice to less than 2 cups a day.  - metFORMIN (GLUCOPHAGE) 1000 MG tablet; Take 1 tablet (1,000 mg) by mouth 2 times daily (with meals)  Dispense: 180 tablet; Refill: 1          Please call or return to clinic if your symptoms don't go away.    Follow up plan  Return in about 4 weeks (around 8/30/2023) for Plan to arrange a [facility] visit on September 14.  At 1030.    Thank you for coming to [facility] today.  Lab Testing:  **If you had lab testing today and your results are reassuring or normal they will be mailed to you or sent through Softdesk within 7 days.   **If the lab tests need quick action we will call you with the results.  **If you are having labs done on a different day, please call [phone] to schedule at [facility] or [phone] for other [facility] Outpatient Lab locations. Labs do not offer walk-in appointments.  The phone number we will call with results is # [phone] (home) . If this is not the best number please call our clinic and change the number.  Medication Refills:  If you need any refills please call your pharmacy and they will contact us.   If you need to  your refill at a new pharmacy, please contact the new pharmacy directly. The new pharmacy will help you get your medications transferred faster.   Scheduling:  If you have any concerns about today's visit or wish to schedule another appointment please call our office during normal business hours [phone] (8-5:00 M-F). If you can no longer make a scheduled visit, please cancel via Softdesk or call us to cancel.   If a referral was made to an [facility]  specialty provider and you do not get a call from central scheduling, please refer to directions on your visit summary or call our office during normal business hours for assistance.   If a Mammogram was ordered for you at the Breast Center call [phone] to schedule or change your appointment.  If you had an XRay/CT/Ultrasound/MRI ordered the number is [phone] to schedule or change your radiology appointment.   [facility] has limited ultrasound appointments available on Wednesdays, if you would like your ultrasound at [facility], please call [phone] to schedule.   Medical Concerns:  If you have urgent medical concerns please call [phone] at any time of the day.    Tanner Sierra MD

## 2023-08-02 NOTE — PROGRESS NOTES
"Dre is a 23 year old who is being evaluated via a billable video visit.      How would you like to obtain your AVS? GIVTED  If the video visit is dropped, the invitation should be resent by: Other e-mail: Signed on through GIVTED  Will anyone else be joining your video visit? Yes: Foster father . How would they like to receive their invitation?           Assessment & Plan     Obesity, Class III, BMI 40-49.9 (morbid obesity) (H)  Because of patient's obesity seems directly related to his psychotropic medications.  And there is a good evidence that adding metformin can decrease the effect decrease the amount of weight gain with them we started metformin also because patient previously had some decreased appetite with metformin previously we used 500 twice daily recently patient has had return to normal of his appetite so he increase back up to 1000 mg twice daily to take with food.  At this point patient is not interested in significantly changing his diet or exercise though will consider decreasing the amount of liquid calories he takes in.  - metFORMIN (GLUCOPHAGE) 1000 MG tablet; Take 1 tablet (1,000 mg) by mouth 2 times daily (with meals)                 Return in about 4 weeks (around 8/30/2023).    Tanner Sierra MD  [facility]    Subjective   Dre is a 23 year old, presenting for the following health issues:  No chief complaint on file.      HPI   Eating more fruit   Sprite still drinking during work. Drinks chocolate milk at home.   Goals: \"I want to be like the hulk\" Strong   Would like to work on losing weight. Though is not willing to commit to any behavior change.   Still working at [employer]      Lab Results   Component Value Date    A1C 5.1 06/09/2023    A1C 5.2 06/03/2022    A1C 4.9 03/01/2021    A1C 4.9 03/06/2020    A1C 4.8 03/21/2019         Review of Systems         Objective           Vitals:  No vitals were obtained today due to virtual visit.  Weight by patient report " 239.4  Wt Readings from Last 4 Encounters:   06/09/23 109 kg (240 lb 3.2 oz)   06/03/22 100.4 kg (221 lb 6.4 oz)   08/17/21 102.2 kg (225 lb 3.2 oz)   03/01/21 103.4 kg (228 lb)      Physical Exam   GENERAL: Healthy, alert and no distress  EYES: Eyes grossly normal to inspection.  No discharge or erythema, or obvious scleral/conjunctival abnormalities.  RESP: No audible wheeze, cough, or visible cyanosis.  No visible retractions or increased work of breathing.    SKIN: Visible skin clear. No significant rash, abnormal pigmentation or lesions.  NEURO: Cranial nerves grossly intact.  Mentation and speech appropriate for age.  PSYCH: Mentation appears normal, affect normal/bright, judgement and insight intact, normal speech and appearance well-groomed.                Video-Visit Details    Type of service:  Video Visit   Video Start Time:  10:30  Video End Time:10:51 AM    Originating Location (pt. Location): Home    Distant Location (provider location):  On-site  Platform used for Video Visit: Robert

## 2023-09-14 ENCOUNTER — VIRTUAL VISIT (OUTPATIENT)
Dept: FAMILY MEDICINE | Facility: CLINIC | Age: 23
End: 2023-09-14
Payer: MEDICARE

## 2023-09-14 DIAGNOSIS — E66.01 OBESITY, CLASS III, BMI 40-49.9 (MORBID OBESITY) (H): Primary | ICD-10-CM

## 2023-09-14 PROCEDURE — 99215 OFFICE O/P EST HI 40 MIN: CPT | Mod: 95 | Performed by: FAMILY MEDICINE

## 2023-09-14 NOTE — PATIENT INSTRUCTIONS
Patient Education   Here is the plan from today's visit    1. Obesity, Class III, BMI 40-49.9 (morbid obesity) (H)  As we talked about today please start injection of Wegovy 1 injection of 0.25 mg once a week for 4 weeks and then increase to 0.5 mg once a week as you know the side effects can be nausea this can be reduced by going for even a short walk after you eat.  - Semaglutide-Weight Management (WEGOVY) 0.25 MG/0.5ML pen; Inject 0.25 mg Subcutaneous once a week  Dispense: 2 mL; Refill: 0  - Semaglutide-Weight Management (WEGOVY) 0.5 MG/0.5ML pen; Inject 0.5 mg Subcutaneous once a week  Dispense: 2 mL; Refill: 0          Please call or return to clinic if your symptoms don't go away.    Follow up plan  Return in 4 weeks (on 10/12/2023) for at 10:30 a, video visit.    Thank you for coming to [facility] today.  Lab Testing:  **If you had lab testing today and your results are reassuring or normal they will be mailed to you or sent through ITI Tech within 7 days.   **If the lab tests need quick action we will call you with the results.  **If you are having labs done on a different day, please call [phone] to schedule at [facility] or [phone] for other [facility] Outpatient Lab locations. Labs do not offer walk-in appointments.  The phone number we will call with results is # [phone] (home) . If this is not the best number please call our clinic and change the number.  Medication Refills:  If you need any refills please call your pharmacy and they will contact us.   If you need to  your refill at a new pharmacy, please contact the new pharmacy directly. The new pharmacy will help you get your medications transferred faster.   Scheduling:  If you have any concerns about today's visit or wish to schedule another appointment please call our office during normal business hours [phone] (8-5:00 M-F). If you can no longer make a scheduled visit, please cancel via ITI Tech or call us to  cancel.   If a referral was made to an [facility] specialty provider and you do not get a call from central scheduling, please refer to directions on your visit summary or call our office during normal business hours for assistance.   If a Mammogram was ordered for you at the Breast Center call [phone] to schedule or change your appointment.  If you had an XRay/CT/Ultrasound/MRI ordered the number is [phone] to schedule or change your radiology appointment.   [facility] has limited ultrasound appointments available on Wednesdays, if you would like your ultrasound at [facility], please call [phone] to schedule.   Medical Concerns:  If you have urgent medical concerns please call [phone] at any time of the day.    Tanner Sierra MD

## 2023-09-14 NOTE — Clinical Note
Please make a video visit appointment for Dre on 10:12 at 10:30 -I do not have a schedule for that day just add him in for one visit. Thanks

## 2023-09-14 NOTE — PROGRESS NOTES
Dre is a 23 year old who is being evaluated via a billable video visit.      How would you like to obtain your AVS? MyChart  If the video visit is dropped, the invitation should be resent by: Text to cell phone: [phone]  Will anyone else be joining your video visit?           Assessment & Plan     Obesity, Class III, BMI 40-49.9 (morbid obesity) (H)  Patient has used metformin for several months now with either stable or increased weight.  He has tried diet though it is very challenging especially while he is antipsychotics.  Overall he is doing well believe he can of understands the risk and benefits of the treatment talked with his foster father who is the willing to help him do his self injection.  We decided to go ahead and prescribe the medication patient reports that he was told by [facility] that the medication is covered if it is indicated.  Plan is to start semaglutide 0.25 mg weekly for 1 month and then to increase 2.5 mg weekly plan to make a follow-up video visit in about a month to see how things are going.  - Semaglutide-Weight Management (WEGOVY) 0.25 MG/0.5ML pen; Inject 0.25 mg Subcutaneous once a week  - Semaglutide-Weight Management (WEGOVY) 0.5 MG/0.5ML pen; Inject 0.5 mg Subcutaneous once a week      I spent a total of 42 minutes on the day of the visit.   Time spent by me doing chart review, history and exam, documentation and further activities per the note           Return in 4 weeks (on 10/12/2023) for at 10:30 a, video visit.    Tanner Sierra MD  [facility]    Guero Erickson is a 23 year old, presenting for the following health issues:  Weight Problem      HPI   Recently spoke with psychiatrist       Having loose stools 2-4 with the metformin   Interested in Semaglutide.has read on line about it.   Review of Systems         Objective         Lab Results   Component Value Date    A1C 5.1 06/09/2023    A1C 5.2 06/03/2022    A1C 4.9 03/01/2021    A1C 4.9 03/06/2020     A1C 4.8 03/21/2019       Vitals:  No vitals were obtained today due to virtual visit.  Weight   242 Lbs   This last Monday was 240.    Physical Exam   GENERAL: Healthy, alert and no distress  EYES: Eyes grossly normal to inspection.  No discharge or erythema, or obvious scleral/conjunctival abnormalities.  RESP: No audible wheeze, cough, or visible cyanosis.  No visible retractions or increased work of breathing.    SKIN: Visible skin clear. No significant rash, abnormal pigmentation or lesions.  NEURO: Cranial nerves grossly intact.  Mentation and speech appropriate for age.  PSYCH: Mentation appears normal, affect normal/bright, judgement and insight intact, normal speech and appearance well-groomed.                Video-Visit Details    Type of service:  Video Visit   Video Start Time: 10:38 AM  Video End Time: 11:15  AM    Originating Location (pt. Location): Home    Distant Location (provider location):  On-site  Platform used for Video Visit: Robert

## 2023-10-05 ENCOUNTER — TELEPHONE (OUTPATIENT)
Dept: FAMILY MEDICINE | Facility: CLINIC | Age: 23
End: 2023-10-05

## 2023-10-05 NOTE — TELEPHONE ENCOUNTER
Prior Authorization Retail Medication Request    Medication/Dose: Wegovy 0.5MG/0.5ML Auto Injector  ICD code (if different than what is on RX):    Previously Tried and Failed:    Rationale:      Insurance Name:  MEDICARE   Insurance ID:  [id number]       Pharmacy Information (if different than what is on RX)  Name:  [facility]   Phone:

## 2023-10-05 NOTE — LETTER
October 17, 2023    Dre  [address] [place] [address]      Dear Whom It May Concern:  Regarding Mr. Dre Mcdaniel Jr.     Mr. Shyam Mcdaniel has been my patient since he was born 23 years ago.  About 4 years ago.  He was diagnosed with schizophrenia.  Prior to that he had a normal weight and had no problems with obesity.  He was placed on quetiapine and risperidone.  This has been very effective for management of his schizophrenia.  And currently in 1 is doing very well living in a group home and actually has a job which she enjoys.  Below you can see his weight graph which started to go up significantly after he started his antipsychotics.  We have attempted diet and exercise plans though these have been ineffective as they are with most people.  We also tried metformin as that has been shown in some people to decrease antipsychotic related weight gain.  At this point and when does not have diabetes though I think it is very likely he will develop if his obesity goes untreated.  His current weight is is 240 pounds and his height is 5 foot 5 inches.  This gives him a BMI of about 40.  This BMI clearly meets the criteria for semaglutide weight management.(Wegovy).  Currently Mr. Mcdaniel is living in a group home where he has the help to assist in using the semaglutide injection.  And also to help in diet management.  I think this medication would be crucial for Mr. Mcdaniel ongoing management because I think he will need to be on his antipsychotic medication for for the rest of his life.       If you have any questions, please do not hesitate to contact me, or our Team Nurse, at clinic.     I thank you in advance for your help.         Respectfully,          Tanner Sierra MD

## 2023-10-10 NOTE — TELEPHONE ENCOUNTER
Central Prior Authorization Team - Phone: [phone]     PA Initiation    Medication: WEGOVY 0.5 MG/0.5ML SC SOAJ  Insurance Company: [employer] Non-Specialty PA's - Phone [phone] Fax [phone]  Pharmacy Filling the Rx: [id number] - [place], [place] - [facility] [address]  Filling Pharmacy Phone: [phone]  Filling Pharmacy Fax:    Start Date: 10/10/2023

## 2023-10-12 NOTE — TELEPHONE ENCOUNTER
Prior Authorization Follow Up    Received message back from [facility]/express scripts that they are the members secondary insurance.  [facility] is only insurance on patient chart currently active.    Called [facility] to clarify what primary coverage is under.    Pharmacist gave primary insurance is under [facility]  BIN [id number] and ID [id number]    Will submit to primary insurance.    PA DEPT  will follow up again on status in 1 to 2 business days.

## 2023-10-12 NOTE — TELEPHONE ENCOUNTER
Central Prior Authorization Team - Phone: [phone]     PA Initiation    Medication: WEGOVY 0.5 MG/0.5ML SC SOAJ  Insurance Company: [employer] Non-Specialty PA's - Phone [phone] Fax [phone]  Pharmacy Filling the Rx: [id number] - [place], [place] - [facility] [address]  Filling Pharmacy Phone: [phone]  Filling Pharmacy Fax:    Start Date: 10/10/2023

## 2023-10-12 NOTE — TELEPHONE ENCOUNTER
Central Prior Authorization Team - Phone: [phone]     PRIOR AUTHORIZATION DENIED    Medication: WEGOVY 0.5 MG/0.5ML SC SOAJ  Insurance Company: [employer] ([facility] - Phone [phone] Fax [phone]  Denial Date: 10/12/2023    Denial Rational:         Appeal Information:  If the provider would like to appeal, please provide a letter of medical necessity and route back to the team. Otherwise you can close the encounter. Thank you, Central PA Team    Patient Notified: NO  Unfortunately, we cannot call the patient with denials because we do not know what next steps the MD will take nor can we give medical advice, please notify the patient of what they are to expect for the continuation of their therapy from the provider.

## 2023-10-16 NOTE — TELEPHONE ENCOUNTER
Central Prior Authorization Team - Phone: [phone]     Medication Appeal Initiation    Medication: WEGOVY 0.5 MG/0.5ML SC SOAJ  Appeal Start Date:  10/16/2023  Insurance Company: [email or web address]  Insurance Phone: [phone]  Insurance Fax: [phone]  Comments:   primary insurance [employer] denial letter faxed to secondary insurance [facility]/express scripts to appeal their denial that primary must have paid first. Request for approval sent via fax.

## 2023-10-17 NOTE — TELEPHONE ENCOUNTER
"Wt Readings from Last 2 Encounters:   06/09/23 109 kg (240 lb 3.2 oz)   06/03/22 100.4 kg (221 lb 6.4 oz)      Ht Readings from Last 2 Encounters:   06/09/23 1.65 m (5' 4.96\")   06/03/22 1.65 m (5' 4.96\")      "

## 2023-10-17 NOTE — TELEPHONE ENCOUNTER
Central Prior Authorization Team - Phone: [phone]     Prior Authorization Follow Up    Hi the patients [facility] insurance is requesting a letter of medical necessity from the provider in order to appeal their coverage of Wegovy since of patients primary insurance which does not cover weight loss drugs. If the provider would like to appeal, please provide a letter of medical necessity and route back to the team. Otherwise you can close the encounter. Thank you, Central PA Team

## 2023-10-18 ENCOUNTER — DOCUMENTATION ONLY (OUTPATIENT)
Dept: FAMILY MEDICINE | Facility: CLINIC | Age: 23
End: 2023-10-18
Payer: MEDICARE

## 2023-10-18 NOTE — PROGRESS NOTES
"When opening a documentation only encounter, be sure to enter in \"Chief Complaint\" Forms and in \" Comments\" Title of form, description if needed.    Dre is a 23 year old  male  Form received via: Fax  Form now resides in: Provider Ready    Rhona tsai    Form has been completed by provider.     Form sent out via: Fax to [email or web address] at Fax Number: [phone]  Patient informed: NA  Output date: October 26, 2023    MARA CUI      **Please close the encounter**             "

## 2023-10-18 NOTE — TELEPHONE ENCOUNTER
Central Prior Authorization Team - Phone: [phone]     Patient insurance plan with [employer] is secondary to patient Medicare plan, and was told by are can only be billed as primary/only coverage on a medication claim if the patient or guardian contacts [facility] customer service to request Coordination of benefits (COB) to have [facility] able to bill as primary for Wegovy medication since primary insurance medicare part D ([employer]) excludes coverage.    Only the patient or legal guardian is able to request change to plan, by calling [phone].    Per patient contacts legal guardian is Thanx [phone]. Please ask provider office to review who contact/guardian and notify them of denial.    Unfortunately, we cannot call the patient with denials because we do not know what next steps the MD will take nor can we give medical advice, please notify the patient/guardian of what they are to expect for the continuation of their therapy from the provider.      Additional information is documented below on  all PA attempts and phone calls to [employer] and [employer] below.  If any additional info is needed from PA team, please call [phone]    Thank you.

## 2023-10-18 NOTE — TELEPHONE ENCOUNTER
Prior Authorization Follow Up    Received fax>  [facility] appeals team faxed states no PA was received on their end, PA was submitted on 10/12/2023 (see screen shot of reject received below)  I called express [employer] since they are the PBM for this plan and denial came from ES.    [employer] states this is a medicaid plan and patient has a medicare part D primary plan, and they are not able to start a PA for a medicare particip.  Gave me [employer] dept phone [phone].     Called [employer] dept spoke with rik , she researched eligibility for patient.  Had to transfer call to another dept [phone]  Trying to submit new PA via phone. Will update with determination.      Fax received 10/17/23      Reject received 10/12/23

## 2023-11-07 ENCOUNTER — TELEPHONE (OUTPATIENT)
Dept: FAMILY MEDICINE | Facility: CLINIC | Age: 23
End: 2023-11-07
Payer: MEDICARE

## 2023-11-07 NOTE — TELEPHONE ENCOUNTER
Prior Authorization Retail Medication Request    Medication/Dose: Loratadine 10 MG  ICD code (if different than what is on RX):    Previously Tried and Failed:    Rationale:      Insurance Name:  MEDICARE   Insurance ID:  [id number]       Pharmacy Information (if different than what is on RX)  Name:    Phone:

## 2023-11-10 NOTE — TELEPHONE ENCOUNTER
Called pharmacy, spoke with [facility], he states this was sent in error.  PA not needed.

## 2023-11-10 NOTE — TELEPHONE ENCOUNTER
Central Prior Authorization Team   Phone: [phone]      PA Initiation    Medication: LORATADINE 10 MG PO TABS  Insurance Company: [employer] - Phone [phone] Fax [phone]  Pharmacy Filling the Rx: [facility] [id number] - [place], [place] - [address]  Filling Pharmacy Phone: [phone]  Filling Pharmacy Fax:    Start Date: 11/10/2023;    Started PA on CMM and a response of Please advise the pharmacy to first submit request to members primary plan.  Called pharmacy and they are closed for lunch.  Will call back later.

## 2024-06-16 ENCOUNTER — HEALTH MAINTENANCE LETTER (OUTPATIENT)
Age: 24
End: 2024-06-16

## 2024-06-20 SDOH — HEALTH STABILITY: PHYSICAL HEALTH: ON AVERAGE, HOW MANY DAYS PER WEEK DO YOU ENGAGE IN MODERATE TO STRENUOUS EXERCISE (LIKE A BRISK WALK)?: 2 DAYS

## 2024-06-20 SDOH — HEALTH STABILITY: PHYSICAL HEALTH: ON AVERAGE, HOW MANY MINUTES DO YOU ENGAGE IN EXERCISE AT THIS LEVEL?: 20 MIN

## 2024-06-20 ASSESSMENT — SOCIAL DETERMINANTS OF HEALTH (SDOH): HOW OFTEN DO YOU GET TOGETHER WITH FRIENDS OR RELATIVES?: ONCE A WEEK

## 2024-06-24 DIAGNOSIS — R79.9 ABNORMAL FINDING OF BLOOD CHEMISTRY, UNSPECIFIED: ICD-10-CM

## 2024-06-24 DIAGNOSIS — E66.01 OBESITY, CLASS III, BMI 40-49.9 (MORBID OBESITY) (H): Primary | ICD-10-CM

## 2024-06-25 ENCOUNTER — OFFICE VISIT (OUTPATIENT)
Dept: FAMILY MEDICINE | Facility: CLINIC | Age: 24
End: 2024-06-25
Payer: MEDICARE

## 2024-06-25 VITALS
HEART RATE: 68 BPM | BODY MASS INDEX: 38.75 KG/M2 | DIASTOLIC BLOOD PRESSURE: 85 MMHG | WEIGHT: 232.6 LBS | HEIGHT: 65 IN | SYSTOLIC BLOOD PRESSURE: 121 MMHG | RESPIRATION RATE: 18 BRPM | TEMPERATURE: 98.5 F | OXYGEN SATURATION: 98 %

## 2024-06-25 DIAGNOSIS — J30.1 NON-SEASONAL ALLERGIC RHINITIS DUE TO POLLEN: ICD-10-CM

## 2024-06-25 DIAGNOSIS — F41.9 ANXIETY: ICD-10-CM

## 2024-06-25 DIAGNOSIS — F51.01 PRIMARY INSOMNIA: ICD-10-CM

## 2024-06-25 DIAGNOSIS — F29 SCHIZOPHRENIA SPECTRUM DISORDER WITH PSYCHOTIC DISORDER TYPE NOT YET DETERMINED (H): ICD-10-CM

## 2024-06-25 DIAGNOSIS — Z00.00 ENCOUNTER FOR MEDICARE ANNUAL WELLNESS EXAM: Primary | ICD-10-CM

## 2024-06-25 DIAGNOSIS — R79.9 ABNORMAL FINDING OF BLOOD CHEMISTRY, UNSPECIFIED: ICD-10-CM

## 2024-06-25 DIAGNOSIS — E66.01 OBESITY, CLASS III, BMI 40-49.9 (MORBID OBESITY) (H): ICD-10-CM

## 2024-06-25 DIAGNOSIS — L91.0 KELOID SCAR: ICD-10-CM

## 2024-06-25 LAB
CHOLEST SERPL-MCNC: 194 MG/DL
FASTING STATUS PATIENT QL REPORTED: YES
HBA1C MFR BLD: 5.4 % (ref 0–5.6)
HDLC SERPL-MCNC: 46 MG/DL
LDLC SERPL CALC-MCNC: 132 MG/DL
NONHDLC SERPL-MCNC: 148 MG/DL
TRIGL SERPL-MCNC: 79 MG/DL

## 2024-06-25 PROCEDURE — G0439 PPPS, SUBSEQ VISIT: HCPCS | Performed by: FAMILY MEDICINE

## 2024-06-25 PROCEDURE — 80061 LIPID PANEL: CPT | Performed by: FAMILY MEDICINE

## 2024-06-25 PROCEDURE — 83036 HEMOGLOBIN GLYCOSYLATED A1C: CPT | Performed by: FAMILY MEDICINE

## 2024-06-25 PROCEDURE — 36415 COLL VENOUS BLD VENIPUNCTURE: CPT | Performed by: FAMILY MEDICINE

## 2024-06-25 PROCEDURE — 99214 OFFICE O/P EST MOD 30 MIN: CPT | Mod: 25 | Performed by: FAMILY MEDICINE

## 2024-06-25 RX ORDER — LORATADINE 10 MG/1
1 TABLET ORAL DAILY
Qty: 90 TABLET | Refills: 3 | Status: SHIPPED | OUTPATIENT
Start: 2024-06-25

## 2024-06-25 RX ORDER — RISPERIDONE 1 MG/1
1 TABLET ORAL AT BEDTIME
Qty: 31 TABLET | Refills: 0 | Status: CANCELLED | OUTPATIENT
Start: 2024-06-25

## 2024-06-25 RX ORDER — METFORMIN HCL 500 MG
500 TABLET, EXTENDED RELEASE 24 HR ORAL 2 TIMES DAILY WITH MEALS
Qty: 180 TABLET | Refills: 2 | Status: SHIPPED | OUTPATIENT
Start: 2024-06-25

## 2024-06-25 RX ORDER — RISPERIDONE 3 MG/1
6 TABLET ORAL AT BEDTIME
Qty: 62 TABLET | Refills: 1 | Status: CANCELLED | OUTPATIENT
Start: 2024-06-25

## 2024-06-25 RX ORDER — BENZTROPINE MESYLATE 0.5 MG/1
0.5 TABLET ORAL DAILY
Qty: 31 TABLET | Refills: 1 | Status: CANCELLED | OUTPATIENT
Start: 2024-06-25

## 2024-06-25 RX ORDER — BUSPIRONE HYDROCHLORIDE 15 MG/1
15 TABLET ORAL 3 TIMES DAILY
Qty: 93 TABLET | Refills: 1 | Status: CANCELLED | OUTPATIENT
Start: 2024-06-25

## 2024-06-25 NOTE — PROGRESS NOTES
Preventive Care Visit  [facility] WILMER Sierra MD, Family Medicine  Jun 25, 2024      Assessment & Plan     Encounter for Medicare annual wellness exam  Patient is on Medicare because of SSI he is overall doing very well schizophrenia is controlled though the side effects of the medication have caused significant obesity.    Schizophrenia spectrum disorder with psychotic disorder type not yet determined (H)  Anxiety  This is a managed by his psychiatrist so not prescribing or managing this but seems to be going well overall    Non-seasonal allergic rhinitis due to pollen  Refill this is controlling his symptoms reasonably well  - loratadine (CLARITIN) 10 MG tablet; Take 1 tablet (10 mg) by mouth daily    Primary insomnia  He can continue taking this he reports it is reasonably well tolerated  - melatonin 5 MG tablet; Take 1 tablet (5 mg) by mouth at bedtime    Obesity, Class III, BMI 40-49.9 (morbid obesity) (H)  We had prescribed semaglutide last year but this was never covered by Medicare.  And so was unable to be stopped previously had been on metformin not the extended release and he does have to any side effects though he is willing to try the extended release metformin at this point he is also interested in decreasing his weight he has had some success in doing this over restricting his calories I encouraged him to add fruits or vegetables to his diet and he agreed to add a banana a day or other fruit.  - Hemoglobin A1c  - Lipid panel reflex to direct LDL Non-fasting  - metFORMIN (GLUCOPHAGE XR) 500 MG 24 hr tablet; Take 1 tablet (500 mg) by mouth 2 times daily (with meals)    Abnormal finding of blood chemistry, unspecified  Patient has a history of elevated A1c previously and so will recheck his A1c today in situ ensure that it has not worsened.  - Hemoglobin A1c    Keloid scar  Keloid scar is has been bothersome itching to him and sometimes painful will remove and send to pathology  "and in procedure clinic.  Patient understood that there would be some pain regarding anesthesia and would need suture removal.  - Procedure Clinic - [facility]; Future            BMI  Estimated body mass index is 38.71 kg/m  as calculated from the following:    Height as of this encounter: 1.651 m (5' 5\").    Weight as of this encounter: 105.5 kg (232 lb 9.6 oz).       Counseling  Appropriate preventive services were discussed with this patient, including applicable screening as appropriate for fall prevention, nutrition, physical activity, Tobacco-use cessation, weight loss and cognition.  Checklist reviewing preventive services available has been given to the patient.  Reviewed patient's diet, addressing concerns and/or questions.   He is at risk for lack of exercise and has been provided with information to increase physical activity for the benefit of his well-being.           Return in about 1 year (around 6/25/2025) for CPE/Wellness Visit, also follow-up as needed for keloid removal.    Guero Erickson is a 24 year old, presenting for the following:  Physical (Annual wellness exam )  Goal to get strong and skinny.       6/25/2024    10:37 AM   Additional Questions   Roomed by Leona   Accompanied by PCA         6/25/2024    Information    services provided? No            Health Care Directive  Patient does not have a Health Care Directive or Living Will: Discussed advance care planning with patient; however, patient declined at this time.    HPI  Patient presents for an annual exam with his foster care foster father Franko.  Obesity  Patient has actually lost some weight recently by trying to reduce his calories he is continuing to work about 25 hours a week at [employer] which he feels good about and he is interested in losing weight.  Patient continues on a number of medications from the psychiatrist which contribute to weight gain though are not controlling his " schizophrenia well.  Patient complains of a scar on his left arm that is itchy and sometimes hurts he reports this came from a mosquito bite that he itched and got infected and then became a raised scar            6/20/2024   General Health   How would you rate your overall physical health? (!) FAIR   Feel stress (tense, anxious, or unable to sleep) Very much      (!) STRESS CONCERN      6/20/2024   Nutrition   Diet: I don't know            6/20/2024   Exercise   Days per week of moderate/strenous exercise 2 days   Average minutes spent exercising at this level 20 min      (!) EXERCISE CONCERN      6/20/2024   Social Factors   Frequency of gathering with friends or relatives Once a week   Worry food won't last until get money to buy more No   Food not last or not have enough money for food? No   Do you have housing? (Housing is defined as stable permanent housing and does not include staying ouside in a car, in a tent, in an abandoned building, in an overnight shelter, or couch-surfing.) Yes   Are you worried about losing your housing? No   Lack of transportation? No   Unable to get utilities (heat,electricity)? No            6/20/2024   Fall Risk   Fallen 2 or more times in the past year? No   Trouble with walking or balance? No             6/20/2024   Activities of Daily Living- Home Safety   Needs help with the following daily activites None of the above   Safety concerns in the home None of the above            6/20/2024   Dental   Dentist two times every year? Yes            6/20/2024   Hearing Screening   Hearing concerns? None of the above            6/20/2024   Driving Risk Screening   Patient/family members have concerns about driving No            6/20/2024   General Alertness/Fatigue Screening   Have you been more tired than usual lately? No            6/20/2024   Urinary Incontinence Screening   Bothered by leaking urine in past 6 months No               Today's PHQ-2 Score:       6/25/2024    10:28 AM    PHQ-2 ( 1999 Pf[employer])   Q1: Little interest or pleasure in doing things 0   Q2: Feeling down, depressed or hopeless 0   PHQ-2 Score 0   Q1: Little interest or pleasure in doing things Not at all   Q2: Feeling down, depressed or hopeless Not at all   PHQ-2 Score 0           6/20/2024   Substance Use   Alcohol more than 3/day or more than 7/wk Not Applicable   Do you have a current opioid prescription? No   How severe/bad is pain from 1 to 10? 0/10 (No Pain)   Do you use any other substances recreationally? No        Social History     Tobacco Use    Smoking status: Former     Current packs/day: 0.50     Types: Cigarettes    Smokeless tobacco: Never   Vaping Use    Vaping status: Every Day   Substance Use Topics    Alcohol use: Yes     Comment: sometimes, denies weekly use    Drug use: No             6/20/2024   Contraception/Family Planning   Questions about contraception or family planning No            Reviewed and updated as needed this visit by Provider   Tobacco  Allergies  Meds  Problems  Med Hx  Surg Hx  Fam Hx              Current providers sharing in care for this patient include:  Patient Care Team:  Tanner Sierra MD as PCP - General (Family Practice)  Yoana Olson MD as MD (Psychiatry)  Tanner Sierra MD as Assigned PCP    The following health maintenance items are reviewed in Epic and correct as of today:  Health Maintenance   Topic Date Due    MEDICARE ANNUAL WELLNESS VISIT  06/25/2025    A1C  06/25/2025    LIPID  06/25/2025    DTAP/TDAP/TD IMMUNIZATION (7 - Td or Tdap) 07/16/2025    ADVANCE CARE PLANNING  06/25/2029    HEPATITIS C SCREENING  Completed    HIV SCREENING  Completed    PHQ-2 (once per calendar year)  Completed    INFLUENZA VACCINE  Completed    IPV IMMUNIZATION  Completed    HPV IMMUNIZATION  Completed    MENINGITIS IMMUNIZATION  Completed    HEPATITIS B IMMUNIZATION  Completed    COVID-19 Vaccine  Completed    Pneumococcal Vaccine: Pediatrics (0 to 5 Years) and At-Risk  "Patients (6 to 64 Years)  Aged Out    RSV MONOCLONAL ANTIBODY  Aged Out            Objective    Exam  /85   Pulse 68   Temp 98.5  F (36.9  C) (Oral)   Resp 18   Ht 1.651 m (5' 5\")   Wt 105.5 kg (232 lb 9.6 oz)   SpO2 98%   BMI 38.71 kg/m     Estimated body mass index is 38.71 kg/m  as calculated from the following:    Height as of this encounter: 1.651 m (5' 5\").    Weight as of this encounter: 105.5 kg (232 lb 9.6 oz).    Physical Exam  Vitals reviewed.   Constitutional:       General: He is not in acute distress.     Appearance: He is well-developed. He is not diaphoretic.   HENT:      Head: Normocephalic.   Eyes:      General: No scleral icterus.     Conjunctiva/sclera: Conjunctivae normal.   Neck:      Thyroid: No thyromegaly.   Cardiovascular:      Rate and Rhythm: Normal rate and regular rhythm.      Heart sounds: Normal heart sounds. No murmur heard.  Pulmonary:      Effort: Pulmonary effort is normal. No respiratory distress.      Breath sounds: Normal breath sounds. No wheezing.   Abdominal:      Hernia: There is no hernia in the left inguinal area or right inguinal area.   Genitourinary:     Penis: Normal and circumcised.       Testes: Normal.   Musculoskeletal:      Cervical back: Normal range of motion.      Left lower leg: No edema.   Skin:     General: Skin is warm and dry.          Neurological:      Mental Status: He is alert and oriented to person, place, and time. Mental status is at baseline.   Psychiatric:         Behavior: Behavior normal.         Thought Content: Thought content normal.         Judgment: Judgment normal.               6/25/2024   Mini Cog   Clock Draw Score 2 Normal   3 Item Recall 3 objects recalled   Mini Cog Total Score 5                 Signed Electronically by: Tanner Sierra MD    "

## 2024-06-25 NOTE — PATIENT INSTRUCTIONS
"Patient Education   Goal and 1 banana a day and to consider exercising 3 times a week.  Preventive Care Advice   This is general advice we often give to help people stay healthy. Your care team may have specific advice just for you. Please talk to your care team about your own preventive care needs.  Lifestyle  Exercise at least 150 minutes each week (30 minutes a day, 5 days a week).  Do muscle strengthening activities 2 days a week. These help control your weight and prevent disease.  No smoking.  Wear sunscreen to prevent skin cancer.  Have your home tested for radon every 2 to 5 years. Radon is a colorless, odorless gas that can harm your lungs. To learn more, go to www.health.[facility].[place].us and search for \"Radon in Homes.\"  Keep guns unloaded and locked up in a safe place like a safe or gun vault, or, use a gun lock and hide the keys. Always lock away bullets separately. To learn more, visit DonorSearch.[place].gov and search for \"safe gun storage.\"  Nutrition  Eat 5 or more servings of fruits and vegetables each day.  Try wheat bread, brown rice and whole grain pasta (instead of white bread, rice, and pasta).  Get enough calcium and vitamin D. Check the label on foods and aim for 100% of the RDA (recommended daily allowance).  Regular exams  Have a dental exam and cleaning every 6 months.  See your health care team every year to talk about:  Any changes in your health.  Any medicines your care team has prescribed.  Preventive care, family planning, and ways to prevent chronic diseases.  Shots (vaccines)   HPV shots (up to age 26), if you've never had them before.  Hepatitis B shots (up to age 59), if you've never had them before.  COVID-19 shot: Get this shot when it's due.  Flu shot: Get a flu shot every year.  Tetanus shot: Get a tetanus shot every 10 years.  Pneumococcal, hepatitis A, and RSV shots: Ask your care team if you need these based on your risk.  Shingles shot (for age 50 and up).  General health tests  Diabetes " screening:  Starting at age 35, Get screened for diabetes at least every 3 years.  If you are younger than age 35, ask your care team if you should be screened for diabetes.  Cholesterol test: At age 39, start having a cholesterol test every 5 years, or more often if advised.  Bone density scan (DEXA): At age 50, ask your care team if you should have this scan for osteoporosis (brittle bones).  Hepatitis C: Get tested at least once in your life.  Abdominal aortic aneurysm screening: Talk to your doctor about having this screening if you:  Have ever smoked; and  Are biologically male; and  Are between the ages of 65 and 75.  STIs (sexually transmitted infections)  Before age 24: Ask your care team if you should be screened for STIs.  After age 24: Get screened for STIs if you're at risk. You are at risk for STIs (including HIV) if:  You are sexually active with more than one person.  You don't use condoms every time.  You or a partner was diagnosed with a sexually transmitted infection.  If you are at risk for HIV, ask about PrEP medicine to prevent HIV.  Get tested for HIV at least once in your life, whether you are at risk for HIV or not.  Cancer screening tests  Cervical cancer screening: If you have a cervix, begin getting regular cervical cancer screening tests at age 21. Most people who have regular screenings with normal results can stop after age 65. Talk about this with your provider.  Breast cancer scan (mammogram): If you've ever had breasts, begin having regular mammograms starting at age 40. This is a scan to check for breast cancer.  Colon cancer screening: It is important to start screening for colon cancer at age 45.  Have a colonoscopy test every 10 years (or more often if you're at risk) Or, ask your provider about stool tests like a FIT test every year or Cologuard test every 3 years.  To learn more about your testing options, visit: www.Juxinli.Freebee/438555.pdf.  For help making a decision, visit:  tony/ee43669.  Prostate cancer screening test: If you have a prostate and are age 55 to 69, ask your provider if you would benefit from a yearly prostate cancer screening test.  Lung cancer screening: If you are a current or former smoker age 50 to 80, ask your care team if ongoing lung cancer screenings are right for you.  For informational purposes only. Not to replace the advice of your health care provider. Copyright   2023 [facility]. All rights reserved. Clinically reviewed by the [facility] Transitions Program. Transcepta 057747 - REV 04/24.  Learning About Stress  What is stress?     Stress is your body's response to a hard situation. Your body can have a physical, emotional, or mental response. Stress is a fact of life for most people, and it affects everyone differently. What causes stress for you may not be stressful for someone else.  A lot of things can cause stress. You may feel stress when you go on a job interview, take a test, or run a race. This kind of short-term stress is normal and even useful. It can help you if you need to work hard or react quickly. For example, stress can help you finish an important job on time.  Long-term stress is caused by ongoing stressful situations or events. Examples of long-term stress include long-term health problems, ongoing problems at work, or conflicts in your family. Long-term stress can harm your health.  How does stress affect your health?  When you are stressed, your body responds as though you are in danger. It makes hormones that speed up your heart, make you breathe faster, and give you a burst of energy. This is called the fight-or-flight stress response. If the stress is over quickly, your body goes back to normal and no harm is done.  But if stress happens too often or lasts too long, it can have bad effects. Long-term stress can make you more likely to get sick, and it can make symptoms of some diseases worse. If you tense up  when you are stressed, you may develop neck, shoulder, or low back pain. Stress is linked to high blood pressure and heart disease.  Stress also harms your emotional health. It can make you matta, tense, or depressed. Your relationships may suffer, and you may not do well at work or school.  What can you do to manage stress?  You can try these things to help manage stress:   Do something active. Exercise or activity can help reduce stress. Walking is a great way to get started. Even everyday activities such as housecleaning or yard work can help.  Try yoga or courtney chi. These techniques combine exercise and meditation. You may need some training at first to learn them.  Do something you enjoy. For example, listen to music or go to a movie. Practice your hobby or do volunteer work.  Meditate. This can help you relax, because you are not worrying about what happened before or what may happen in the future.  Do guided imagery. Imagine yourself in any setting that helps you feel calm. You can use online videos, books, or a teacher to guide you.  Do breathing exercises. For example:  From a standing position, bend forward from the waist with your knees slightly bent. Let your arms dangle close to the floor.  Breathe in slowly and deeply as you return to a standing position. Roll up slowly and lift your head last.  Hold your breath for just a few seconds in the standing position.  Breathe out slowly and bend forward from the waist.  Let your feelings out. Talk, laugh, cry, and express anger when you need to. Talking with supportive friends or family, a counselor, or a linda leader about your feelings is a healthy way to relieve stress. Avoid discussing your feelings with people who make you feel worse.  Write. It may help to write about things that are bothering you. This helps you find out how much stress you feel and what is causing it. When you know this, you can find better ways to cope.  What can you do to prevent  "stress?  You might try some of these things to help prevent stress:  Manage your time. This helps you find time to do the things you want and need to do.  Get enough sleep. Your body recovers from the stresses of the day while you are sleeping.  Get support. Your family, friends, and community can make a difference in how you experience stress.  Limit your news feed. Avoid or limit time on social media or news that may make you feel stressed.  Do something active. Exercise or activity can help reduce stress. Walking is a great way to get started.  Where can you learn more?  Go to https://www.Suite101.net/patiented  Enter N032 in the search box to learn more about \"Learning About Stress.\"  Current as of: October 24, 2023               Content Version: 14.0    7947-8239 [employer].   Care instructions adapted under license by your healthcare professional. If you have questions about a medical condition or this instruction, always ask your healthcare professional. [employer] disclaims any warranty or liability for your use of this information.         "

## 2024-06-26 ENCOUNTER — TELEPHONE (OUTPATIENT)
Dept: FAMILY MEDICINE | Facility: CLINIC | Age: 24
End: 2024-06-26
Payer: MEDICARE

## 2024-06-26 NOTE — TELEPHONE ENCOUNTER
[facility] phone call message- general phone call:    Reason for call: Oriana Cota states that she is the patient's guardian through the [employer] and that the doctor () would need verbal consent before the patients procedure 06/27 at 8:00am.     Return call needed: Yes    OK to leave a message on voice mail? Yes    Primary language: English      needed? No    Call taken on June 26, 2024 at 12:41 PM by Beti Galeana

## 2024-06-27 ENCOUNTER — OFFICE VISIT (OUTPATIENT)
Dept: FAMILY MEDICINE | Facility: CLINIC | Age: 24
End: 2024-06-27
Attending: FAMILY MEDICINE
Payer: MEDICARE

## 2024-06-27 VITALS
HEART RATE: 65 BPM | WEIGHT: 233.2 LBS | TEMPERATURE: 98.7 F | OXYGEN SATURATION: 97 % | RESPIRATION RATE: 12 BRPM | HEIGHT: 65 IN | DIASTOLIC BLOOD PRESSURE: 86 MMHG | SYSTOLIC BLOOD PRESSURE: 122 MMHG | BODY MASS INDEX: 38.85 KG/M2

## 2024-06-27 DIAGNOSIS — L91.0 KELOID SCAR: ICD-10-CM

## 2024-06-27 PROCEDURE — 88305 TISSUE EXAM BY PATHOLOGIST: CPT | Performed by: DERMATOLOGY

## 2024-06-27 PROCEDURE — 11441 EXC FACE-MM B9+MARG 0.6-1 CM: CPT | Performed by: FAMILY MEDICINE

## 2024-06-27 NOTE — PROGRESS NOTES
[facility]  [facility]    Dre Mcdaniel is a patient of Tanner Engle here for Keloid excision   Indication: irritated keloid scar at site of a distant local infection,m patient has no other keloids    Consent: Affirmation of informed consent was signed and scanned into the medical record. Risks, benefits and alternatives were discussed. Patient's questions were elicited and answered.   Procedure safety checklist was completed:  Yes  Time Out (Pause for the Cause) completed: Yes    Labs:     Preoperative Diagnosis: Keloid scar  Postoperative Diagnosis: same    Technique:   Skin prep Betadine  Anesthesia 1% lidocaine, with epi  Suture  Yes 4-0 ethilon  EBL: minimal  Complications:  No  Tolerance:  Pt tolerated procedure well and was in stable condition.   Patholgy sent: Yes      Follow up: Pt was instructed to call if bleeding, severe pain or foul smell.   Follow up in 1-2 weeks.for suture removal       Medical student Lea was present and assisted during the procedure  Faculty: Tanner Sierra MD present for and supervised this entire procedure.

## 2024-06-27 NOTE — PATIENT INSTRUCTIONS
Skin Biopsy  What is a skin biopsy?   A skin biopsy is the removal of a small piece of skin for lab tests. It may be done to help diagnose a problem with the skin.   When is it used?   A skin biopsy will help your healthcare provider make a diagnosis of your problem. For example:   You may have an internal disease that a skin biopsy may explain.   You may have a skin disease or cancer.   Your skin may have become discolored.   Your skin may be inflamed.   Alternatives to this procedure include:   to proceed with treatment without a diagnosis   to choose not to have treatment, recognizing the risks of your condition   to take a watchful approach and reevaluate the lesion or disorder at a future time.   When should I call my healthcare provider?   Call your provider right away if:   You have bleeding that cannot be stopped by putting pressure on the wound.   Your wound becomes red or has pus or you develop a fever (signs of infection).   You have significant pain that is not controlled with ibuprofen or tylenol.     Wound Care  1. Keep it covered for the first 2-3 days with a band aid, or if it is a large wound or is draining a lot, a small piece of gauze with tape.  2. Apply a thin film of vaseline over the area to keep it mildly moist and prevent scab formation.   3. Change your bandaid daily.  4. As you heal, the new skin will be very sensitive to sun - please protect your healing wound by covering up and using sunscreen.   5. Results will be mailed or called to you. It can take up to 10 days to get biopsy results back. If you do not hear from us, please call us at [phone] and let us know that you haven't received your results.

## 2024-06-27 NOTE — LETTER
RETURN TO WORK/SCHOOL FORM    6/27/2024    Re: Dre Mcdaniel Jr.  2000      To Whom It May Concern:     Dre Mcdaniel Jr. was seen in clinic today..  He may return to work with restrictions on 6/28/24          Restrictions:  limited to light duty - lifting no greater than 20 pounds for one week             Tanner Sierra MD

## 2024-07-02 LAB
PATH REPORT.COMMENTS IMP SPEC: NORMAL
PATH REPORT.COMMENTS IMP SPEC: NORMAL
PATH REPORT.FINAL DX SPEC: NORMAL
PATH REPORT.GROSS SPEC: NORMAL
PATH REPORT.MICROSCOPIC SPEC OTHER STN: NORMAL
PATH REPORT.RELEVANT HX SPEC: NORMAL

## 2024-07-08 ENCOUNTER — OFFICE VISIT (OUTPATIENT)
Dept: FAMILY MEDICINE | Facility: CLINIC | Age: 24
End: 2024-07-08
Payer: MEDICARE

## 2024-07-08 VITALS
BODY MASS INDEX: 38.81 KG/M2 | OXYGEN SATURATION: 97 % | TEMPERATURE: 98.2 F | HEIGHT: 65 IN | DIASTOLIC BLOOD PRESSURE: 79 MMHG | RESPIRATION RATE: 16 BRPM | HEART RATE: 75 BPM | SYSTOLIC BLOOD PRESSURE: 117 MMHG

## 2024-07-08 DIAGNOSIS — Z48.01 ENCOUNTER FOR CHANGE OR REMOVAL OF SURGICAL WOUND DRESSING: Primary | ICD-10-CM

## 2024-07-08 PROCEDURE — 99207 PR NO CHARGE LOS: CPT

## 2024-07-08 NOTE — PATIENT INSTRUCTIONS
Patient Education   Here is the plan from today's visit    1. Encounter for change or removal of surgical wound dressing  - Suture Removal No Charge      Follow up plan  Return for Follow up if bleeding, severe pain, foul smell and discharge. .    Thank you for coming to [facility] today.  Lab Testing:  **If you had lab testing today and your results are reassuring or normal they will be mailed to you or sent through [employer] within 7 days.   **If the lab tests need quick action we will call you with the results.  **If you are having labs done on a different day, please call [phone] to schedule at [facility] or [phone] for other [facility] locations. Labs do not offer walk-in appointments.  The phone number we will call with results is # [phone] (home) . If this is not the best number please call our clinic and change the number.  Medication Refills:  If you need any refills please call your pharmacy and they will contact us.   If you need to  your refill at a new pharmacy, please contact the new pharmacy directly. The new pharmacy will help you get your medications transferred faster.   Scheduling:  If you have any concerns about today's visit or wish to schedule another appointment please call our office during normal business hours [phone] (8-5:00 M-F). If you can no longer make a scheduled visit, please cancel via [employer] or call us to cancel.   If a referral was made to an [facility] specialty provider and you do not get a call from central scheduling, please refer to directions on your visit summary or call our office during normal business hours for assistance.   If a Mammogram was ordered for you at the [facility] call [phone] to schedule or change your appointment.  If you had an XRay/CT/Ultrasound/MRI ordered the number is [phone] to schedule or change your radiology appointment.   [facility] has limited ultrasound appointments  available on Wednesdays, if you would like your ultrasound at [facility], please call [phone] to schedule.   Medical Concerns:  If you have urgent medical concerns please call [phone] at any time of the day.    Reed Albert MD

## 2024-07-08 NOTE — PROGRESS NOTES
"  Assessment & Plan     Encounter for change or removal of surgical wound dressing  Dre Mcdaniel Jr. presents to the clinic for removal of sutures. The patient has had sutures in place for 12 days. There has been no patient reported signs or symptoms of infection or drainage. Sutures are seen and located on the left arm. All sutures were easily removed today. Routine wound care discussed by the RN or provider. The patient will follow up as needed.   - Suture Removal No Charge    BMI  Estimated body mass index is 38.81 kg/m  as calculated from the following:    Height as of this encounter: 1.651 m (5' 5\").    Weight as of 6/27/24: 105.8 kg (233 lb 3.2 oz).       Return for Follow up if bleeding, severe pain, foul smell and discharge. .    Subjective   Dre is a 24 year old, presenting for the following health issues:  suture removal (Left arm)        7/8/2024    10:44 AM   Additional Questions   Roomed by Doua   Accompanied by PCA         7/8/2024    10:44 AM   Patient Reported Additional Medications   Patient reports taking the following new medications n/a         7/8/2024    Information    services provided? No        HPI       Suture removal:   Date sutures applied: 6/27/24         Where (setting[facility] in which they [facility]   Description:  Type: Keloid scar   Location: Left arm   History:    Cause of laceration: Keloid scar   Accompanying Signs & Symptoms:   Redness: No  Warmth: No  Drainage: No  Still bleeding: No  Fevers: No     - healing well. Patient with no concerns     Review of Systems  Constitutional, HEENT, cardiovascular, pulmonary, gi and gu systems are negative, except as otherwise noted.      Objective    /79   Pulse 75   Temp 98.2  F (36.8  C) (Oral)   Resp 16   Ht 1.651 m (5' 5\")   SpO2 97%   BMI 38.81 kg/m    Body mass index is 38.81 kg/m .  Physical Exam   GENERAL: alert and no distress  RESP: lungs clear to auscultation - no rales, rhonchi or " wheezes  CV: regular rate and rhythm, normal S1 S2, no S3 or S4, no murmur, click or rub, no peripheral edema  SKIN: Well healing wound located on the left upper arm. No erythema, warmth, or swelling. No discharge.   NEURO: mentation intact and speech normal  PSYCH: mentation appears normal, affect normal/bright          Signed Electronically by: Reed Albert MD

## 2024-07-08 NOTE — PROGRESS NOTES
Preceptor Attestation:    I discussed the patient with the resident and evaluated the patient in person. I have verified the content of the note, which accurately reflects my assessment of the patient and the plan of care.   Supervising Physician:  Evie Lewis MD.

## 2024-12-30 NOTE — LETTER
March 25, 2019      Dre Mcdaniel Jr.  [address] [place] [id number]        Dear Dre,    Thank you for getting your care at [facility]. Please see below for your test results. The TB test is negative.    Resulted Orders   Quantiferon TB Gold Plus   Result Value Ref Range    Quantiferon-TB Gold Plus Result Negative NEG^Negative      Comment:      No interferon gamma response to M.tuberculosis antigens was detected.   Infection with M.tuberculosis is unlikely, however a single negative result   does not exclude infection. In patients at high risk for infection, a second   test should be considered  in accordance with the 2017 ATS/IDSA/CDC Clinical Practice Guidelines for   Diagnosis of Tuberculosis in Adults and Children [Lewinsohn DM et   al.Clin.Infect.Dis. 2017 64(2):111-115].      TB1 Ag minus Nil Value 0.01 IU/mL    TB2 Ag minus Nil Value 0.01 IU/mL    Mitogen minus Nil Result 3.25 IU/mL    Nil Result 0.04 IU/mL   Lipid Cascade ([facility])   Result Value Ref Range    Cholesterol 184.1 0.0 - 200.0 mg/dL    Cholesterol/HDL Ratio 3.3 0.0 - 5.0    HDL Cholesterol 55.7 >40.0 mg/dL    LDL Cholesterol Calculated 115 0 - 129 mg/dL    Triglycerides 67.7 0.0 - 150.0 mg/dL    VLDL Cholesterol 13.5 7.0 - 32.0 mg/dL   Hemoglobin A1c ([facility])   Result Value Ref Range    Hemoglobin A1C 4.8 4.1 - 5.7 %   CBC with Diff Plt ([facility])   Result Value Ref Range    WBC 4.3 4.0 - 11.0 K/uL    Lymphocytes # 1.3 0.8 - 5.3 K/uL    % Lymphocytes 30.1 20.0 - 48.0 %L    Mid # 0.5 0.0 - 2.2 K/uL    Mid % 12.5 0.0 - 20.0 %M    GRANULOCYTES # 2.5 1.6 - 8.3 K/uL    % Granulocytes 57.4 40.0 - 75.0 %G    RBC 4.68 4.40 - 5.90 M/uL    Hemoglobin 13.8 13.3 - 17.7 g/dL    Hematocrit 45.6 40.0 - 53.0 %    MCV 97.5 78.0 - 100.0 fL    MCH 29.5 26.5 - 35.0 pg    MCHC 30.3 (L) 32.0 - 36.0 g/dL    Platelets 310.0 150.0 - 450.0 K/uL   Comprehensive Metabolic Panel ([facility])   Result Value Ref Range    Albumin 4.8 3.8 - 5.0  Medicare Wellness Visit  Plan for Preventive Care    A good way for you to stay healthy is to use preventive care.  Medicare covers many services that can help you stay healthy.* The goal of these services is to find any health problems as quickly as possible. Finding problems early can help make them easier to treat.  Your personal plan below lists the services you may need and when they are due.      Health Maintenance Summary       COVID-19 Vaccine (9 - 2024-25 season)  Overdue since 9/23/2024    Traditional Medicare- Medicare Wellness Visit (Yearly)  Overdue since 11/1/2024    Diabetes Eye Exam (Yearly)  Next due on 5/23/2025    Diabetes A1C (Every 6 Months)  Ordered on 12/26/2024    Microalbumin Ratio (Yearly)  Ordered on 9/9/2024    Diabetes Foot Exam (Yearly)  Next due on 9/9/2025    GFR (Yearly)  Next due on 11/29/2025    DTaP/Tdap/Td Vaccine (2 - Td or Tdap)  Next due on 12/29/2027    Pneumococcal Vaccine 65+   Completed    Hepatitis C Screening   Completed    Shingles Vaccine   Completed    Osteoporosis Screening   Completed    Hepatitis B Vaccine (For Physician/APC Discussion)   Completed    Influenza Vaccine   Completed    Meningococcal Vaccine   Aged Out    HPV Vaccine   Aged Out    Lung Cancer Screening   Scheduled for 4/2/2025    Colorectal Cancer Screen   Discontinued             Preventive Care for Women and Men    Heart Screenings (Cardiovascular):  Blood tests are used to check your cholesterol, lipid and triglyceride levels. High levels can increase your risk for heart disease and stroke. High levels can be treated with medications, diet and exercise. Lowering your levels can help keep your heart and blood vessels healthy.  Your provider will order these tests if they are needed.    An ultrasound is done to see if you have an abdominal aortic aneurysm (AAA).  This is an enlargement of one of the main blood vessels that delivers blood to the body.   In the [place], 9,000 deaths are caused by  AAA.  You may not even know you have this problem and as many as 1 in 3 people will have a serious problem if it is not treated.  Early diagnosis allows for more effective treatment and cure.  If you have a family history of AAA or are a male age 65-75 who has smoked, you are at higher risk of an AAA.  Your provider can order this test, if needed.    Colorectal Screening:  There are many tests that are used to check for cancer of your colon and rectum. You and your provider should discuss what test is best for you and when to have it done.  Options include:  Screening Colonoscopy: exam of the entire colon, seen through a flexible lighted tube.  Flexible Sigmoidoscopy: exam of the last third (sigmoid portion) of the colon and rectum, seen through a flexible lighted tube.  Cologuard DNA stool test: a sample of your stool is used to screen for cancer and unseen blood in your stool.  Fecal Occult Blood Test: a sample of your stool is studied to find any unseen blood    Flu Shot:  An immunization that helps to prevent influenza (the flu). You should get this every year. The best time to get the shot is in the fall.    Pneumococcal Shot:  Vaccines are available that can help prevent pneumococcal disease, which is any type of infection caused by Streptococcus pneumoniae bacteria.   Their use can prevent some cases of pneumonia, meningitis, and sepsis. There are two types of pneumococcal vaccines:   Conjugate vaccines (PCV-13 or Prevnar 13®) - helps protect against the 13 types of pneumococcal bacteria that are the most common causes of serious infections in children and adults.    Polysaccharide vaccine (PPSV23 or Aylvkxpcd17®) - helps protect against 23 types of pneumococcal bacteria for patients who are recommended to get it.  These vaccines should be given at least 12 months apart.  A booster is usually not needed.     Hepatitis B Shot:  An immunization that helps to protect people from getting Hepatitis B. Hepatitis B  mg/dL    Alkaline Phosphatase 84.2 65.0 - 260.0 U/L    ALT 37.8 0.0 - 45.0 U/L    AST 21.3 0.0 - 45.0 U/L    Bilirubin Total 0.4 0.2 - 1.3 mg/dL    Urea Nitrogen 8.9 7.0 - 21.0 mg/dL    Calcium 9.9 8.5 - 10.1 mg/dL    Chloride 100.9 94.0 - 109.0 mmol/L    Carbon Dioxide 24.7 20.0 - 32.0 mmol/L    Creatinine 0.7 0.5 - 1.0 mg/dL    Glucose 109.9 (H) 70.0 - 99.0 mg'dL    Potassium 3.9 3.3 - 4.5 mmol/dL    Sodium 136.7 132.6 - 141.4 mmol/L    Protein Total 8.1 6.8 - 8.8 g/dL    GFR Estimate >90 >60.0 mL/min/1.7 m2    GFR Estimate If Black >90 >60.0 mL/min/1.7 m2       If you have any concerns about these results please call and leave a message for me or send a MyChart message to the clinic.    Sincerely,    Tanner Sierra MD     is a virus that spreads through contact with infected blood or body fluids. Many people with the virus do not have symptoms.  The virus can lead to serious problems, such as liver disease. Some people are at higher risk than others. Your doctor will tell you if you need this shot.     Diabetes Screening:  A test to measure sugar (glucose) in your blood is called a fasting blood sugar. Fasting means you cannot have food or drink for at least 8 hours before the test. This test can detect diabetes long before you may notice symptoms.    Glaucoma Screening:  Glaucoma screening is performed by your eye doctor. The test measures the fluid pressure inside your eyes to determine if you have glaucoma.     Hepatitis C Screening:  A blood test to see if you have the hepatitis C virus.  Hepatitis C attacks the liver and is a major cause of chronic liver disease.  Medicare will cover a single screening for all adults born between 1945 & 1965, or high risk patients (people who have injected illegal drugs or people who have had blood transfusions).  High risk patients who continue to inject illegal drugs can be screened for Hepatitis C every year.    Smoking and Tobacco-Use Cessation Counseling:  Tobacco is the single greatest cause of disease and early death in our country today. Medication and counseling together can increase a person’s chance of quitting for good.   Medicare covers two quitting attempts per year, with four counseling sessions per attempt (eight sessions in a 12 month period)    Preventive Screening tests for Women    Screening Mammograms and Breast Exams:  An x-ray of your breasts to check for breast cancer before you or your doctor may be able to feel it.  If breast cancer is found early it can usually be treated with success.    Pelvic Exams and Pap Tests:  An exam to check for cervical and vaginal cancer. A Pap test is a lab test in which cells are taken from your cervix and sent to the lab to look for signs  of cervical cancer. If cancer of the cervix is found early, chances for a cure are good. Testing can generally end at age 65, or if a woman has a hysterectomy for a benign condition. Your provider may recommend more frequent testing if certain abnormal results are found.    Bone Mass Measurements:  A painless x-ray of your bone density to see if you are at risk for a broken bone. Bone density refers to the thickness of bones or how tightly the bone tissue is packed.      *Medicare pays for many preventive services to keep you healthy. For some of these services, you might have to pay a deductible, coinsurance, and / or copayment.  The amounts vary depending on the type of services you need and the kind of Medicare health plan you have.    For further details on screenings offered by Medicare please visit: https://www.medicare.gov/coverage/preventive-screening-services

## 2025-04-02 DIAGNOSIS — E66.01 OBESITY, CLASS III, BMI 40-49.9 (MORBID OBESITY) (H): ICD-10-CM

## 2025-04-02 RX ORDER — METFORMIN HYDROCHLORIDE 500 MG/1
1000 TABLET, EXTENDED RELEASE ORAL 2 TIMES DAILY WITH MEALS
Qty: 120 TABLET | Refills: 3 | Status: SHIPPED | OUTPATIENT
Start: 2025-04-02

## 2025-04-02 NOTE — TELEPHONE ENCOUNTER
"Request for medication refill:    Medication Name: metFORMIN (GLUCOPHAGE XR) 500 MG 24 hr tablet    Providers if patient needs an appointment and you are willing to give a one month supply please refill for one month and  send a MyChart using \".SMILLIMITEDREFILL\" .Or route chart to \"[facility] \" . To call patient and inform of limited refill and providers request to make an appointment. (Giving one month refill in non controlled medications is strongly recommended before denial)    If refill has been denied, meaning absolutely no refills without visit, please complete the smart phrase \".SMIRXREFUSE\" and route it to the \"[facility] MED REFILLS\"  pool to inform the patient and the pharmacy.    Selwyn Archer MA      "

## 2025-06-09 DIAGNOSIS — E66.813 OBESITY, CLASS III, BMI 40-49.9 (MORBID OBESITY) (H): ICD-10-CM

## 2025-06-09 DIAGNOSIS — Z13.1 ENCOUNTER FOR SCREENING FOR DIABETES MELLITUS: ICD-10-CM

## 2025-06-09 SDOH — HEALTH STABILITY: PHYSICAL HEALTH
ON AVERAGE, HOW MANY DAYS PER WEEK DO YOU ENGAGE IN MODERATE TO STRENUOUS EXERCISE (LIKE A BRISK WALK)?: PATIENT DECLINED

## 2025-06-09 SDOH — HEALTH STABILITY: PHYSICAL HEALTH: ON AVERAGE, HOW MANY MINUTES DO YOU ENGAGE IN EXERCISE AT THIS LEVEL?: PATIENT DECLINED

## 2025-06-09 ASSESSMENT — SOCIAL DETERMINANTS OF HEALTH (SDOH): HOW OFTEN DO YOU GET TOGETHER WITH FRIENDS OR RELATIVES?: ONCE A WEEK

## 2025-06-10 ENCOUNTER — OFFICE VISIT (OUTPATIENT)
Dept: FAMILY MEDICINE | Facility: CLINIC | Age: 25
End: 2025-06-10
Payer: MEDICARE

## 2025-06-10 VITALS
RESPIRATION RATE: 17 BRPM | OXYGEN SATURATION: 98 % | SYSTOLIC BLOOD PRESSURE: 139 MMHG | HEIGHT: 70 IN | WEIGHT: 234.9 LBS | TEMPERATURE: 98 F | DIASTOLIC BLOOD PRESSURE: 92 MMHG | BODY MASS INDEX: 33.63 KG/M2 | HEART RATE: 85 BPM

## 2025-06-10 DIAGNOSIS — F41.9 ANXIETY: ICD-10-CM

## 2025-06-10 DIAGNOSIS — Z13.1 ENCOUNTER FOR SCREENING FOR DIABETES MELLITUS: ICD-10-CM

## 2025-06-10 DIAGNOSIS — Z00.00 ENCOUNTER FOR MEDICARE ANNUAL WELLNESS EXAM: Primary | ICD-10-CM

## 2025-06-10 DIAGNOSIS — E66.813 OBESITY, CLASS III, BMI 40-49.9 (MORBID OBESITY) (H): ICD-10-CM

## 2025-06-10 DIAGNOSIS — R37 SEXUAL DYSFUNCTION: ICD-10-CM

## 2025-06-10 LAB
CHOLEST SERPL-MCNC: 215 MG/DL
EST. AVERAGE GLUCOSE BLD GHB EST-MCNC: 100 MG/DL
FASTING STATUS PATIENT QL REPORTED: NO
HBA1C MFR BLD: 5.1 % (ref 0–5.6)
HDLC SERPL-MCNC: 53 MG/DL
LDLC SERPL CALC-MCNC: 145 MG/DL
NONHDLC SERPL-MCNC: 162 MG/DL
PROLACTIN SERPL 3RD IS-MCNC: 64 NG/ML (ref 4–15)
TRIGL SERPL-MCNC: 87 MG/DL

## 2025-06-10 ASSESSMENT — PAIN SCALES - GENERAL: PAINLEVEL_OUTOF10: NO PAIN (0)

## 2025-06-10 NOTE — PROGRESS NOTES
"Preventive Care Visit  [facility] HospitalARIELLE Sierra MD, Family Medicine  Gino 10, 2025      Assessment & Plan     Encounter for Medicare annual wellness exam:  - Continue routine health monitoring and preventive care.    Obesity, Class III:  - Encourage joining a gym and increasing physical activity, such as walking two days a week and considering biking to work.     Has a legal guardian  -Is trying to get letters to support guardian termination, I've encouraged demonstrating responsibility in medication, work, and independence and that I would need to have a discussion with his Psychiatric provider prior to signing on to a letter    Encounter for screening for diabetes mellitus:  - Continue monitoring blood glucose levels and maintain current medication regimen.    Anxiety:  - Continue current medications as they are helping with mental health.    Sexual dysfunction:  - Likely related to sertraline use, causing erectile dysfunction.  - Check prolactin levels. Discuss with psychiatrist for medication adjustments. Consider bupropion for sexual adverse side effects     Consent was obtained from the patient to use an AI documentation tool in the creation of this note.            BMI  Estimated body mass index is 34.19 kg/m  as calculated from the following:    Height as of this encounter: 1.765 m (5' 9.5\").    Weight as of this encounter: 106.5 kg (234 lb 14.4 oz).       Counseling  Appropriate preventive services were addressed with this patient via screening, questionnaire, or discussion as appropriate for fall prevention, nutrition, physical activity, Tobacco-use cessation, social engagement, weight loss and cognition.  Checklist reviewing preventive services available has been given to the patient.  Reviewed patient's diet, addressing concerns and/or questions.   He is at risk for psychosocial distress and has been provided with information to reduce risk.   Dre was seen today for annual " visit.    Diagnoses and all orders for this visit:    Encounter for Medicare annual wellness exam    Obesity, Class III, BMI 40-49.9 (morbid obesity) (H)  -     Lipid panel reflex to direct LDL Non-fasting  -     HEMOGLOBIN A1C    Encounter for screening for diabetes mellitus  -     HEMOGLOBIN A1C    Anxiety    Sexual dysfunction  -     Prolactin; Future       The longitudinal plan of care for the diagnosis(es)/condition(s) as documented were addressed during this visit. Due to the added complexity in care, I will continue to support Dre in the subsequent management and with ongoing continuity of care.  Prescription drug management      Return if symptoms worsen or fail to improve, for also get BIANKA to allow communications between PCP and Psychiatric team. .             Subjective   Dre is a 25 year old, presenting for the following:  Annual Visit  Psychiatrist Meri Ramirez   [facility]   Therapist Dawna Horton      6/10/2025    11:07 AM   Additional Questions   Roomed by Selwyn   Accompanied by Self         6/10/2025    Information    services provided? No          HPI  Dre Mcdaniel , age: 25 years    Blood pressure concern  - Blood pressure was noted to be slightly elevated during the visit  - Typically has low blood pressure    Medication history  - Currently taking metformin, two tablets twice a day  - Previously took Seroquel, but no longer taking it  - Currently taking Risperdal, 7 mg at bedtime (two 3 mg tablets and one 1 mg tablet)  - Currently taking sertraline, Buspar (buspirone), and benztropine  - Reports that medications are helping with mental health    Exercise and lifestyle  - Previously biked 11 miles to work daily when younger  - Currently does not exercise much but wants to change that  - Plans to join a gym to get healthier  - Previously biked to work a couple of times but now uses Lyft for transportation  - Interested in trying an  E-bike  - Plans to walk around the block two or three times a week    Vaping  - Vapes nicotine  - Does not see vaping as a problem  - Vapes socially, often before work, during breaks, and while waiting for transportation    Sexual health concerns  - Reports erectile dysfunction, with partial erections since starting medications at age 18  - Concerned about future sexual activity and potential relationship issues  - Interested in checking prolactin levels due to concerns about sexual health    Living situation  - Has lived with Franko since April 2023  - Has a legal guardian who makes decisions for him    Social and mental health  - Sees a psychiatrist and therapist  - Reports support from psychiatrist in managing medications and mental health    Family and social context  - No specific family history or context mentioned in the transcript        Advance Care Planning    Discussed advance care planning with patient; informed AVS has link to [facility].  Patient has a guardian        6/9/2025   General Health   How would you rate your overall physical health? (!) FAIR   Feel stress (tense, anxious, or unable to sleep) To some extent   (!) STRESS CONCERN      6/9/2025   Nutrition   Diet: Regular (no restrictions)         6/9/2025   Exercise   Days per week of moderate/strenous exercise Patient declined   Average minutes spent exercising at this level Patient declined         6/9/2025   Social Factors   Frequency of gathering with friends or relatives Once a week   Worry food won't last until get money to buy more No   Food not last or not have enough money for food? No   Do you have housing? (Housing is defined as stable permanent housing and does not include staying outside in a car, in a tent, in an abandoned building, in an overnight shelter, or couch-surfing.) Yes   Are you worried about losing your housing? No   Lack of transportation? No   Unable to get utilities (heat,electricity)? No         6/9/2025   Fall  Risk   Fallen 2 or more times in the past year? No   Trouble with walking or balance? No          6/9/2025   Activities of Daily Living- Home Safety   Needs help with the following daily activites None of the above   Safety concerns in the home None of the above         6/9/2025   Dental   Dentist two times every year? Yes         6/9/2025   Hearing Screening   Hearing concerns? None of the above         6/9/2025   Driving Risk Screening   Patient/family members have concerns about driving No         6/9/2025   General Alertness/Fatigue Screening   Have you been more tired than usual lately? No         6/9/2025   Urinary Incontinence Screening   Bothered by leaking urine in past 6 months No         Today's PHQ-2 Score:       6/9/2025    12:43 PM   PHQ-2 ( 1999 [employer])   Q1: Little interest or pleasure in doing things 0   Q2: Feeling down, depressed or hopeless 0   PHQ-2 Score 0    Q1: Little interest or pleasure in doing things Not at all   Q2: Feeling down, depressed or hopeless Not at all   PHQ-2 Score 0       Patient-reported           6/9/2025   Substance Use   Alcohol more than 3/day or more than 7/wk Not Applicable   Do you have a current opioid prescription? No   How severe/bad is pain from 1 to 10? 0/10 (No Pain)   Do you use any other substances recreationally? No     Social History     Tobacco Use    Smoking status: Former     Current packs/day: 0.50     Types: Cigarettes     Passive exposure: Past    Smokeless tobacco: Never   Vaping Use    Vaping status: Every Day    Substances: Nicotine    Devices: Disposable   Substance Use Topics    Alcohol use: Yes     Comment: sometimes, denies weekly use    Drug use: No             6/9/2025   Contraception/Family Planning   Questions about contraception or family planning No         Reviewed and updated as needed this visit by Provider   Tobacco  Allergies  Meds  Problems  Med Hx  Surg Hx  Fam Hx     Sexual Activity            Current providers sharing in  "care for this patient include:  Patient Care Team:  Tanner Sierra MD as PCP - General (Family Practice)  Yoana Olson MD as MD (Psychiatry)  Tanner Sierra MD as Assigned PCP    The following health maintenance items are reviewed in Epic and correct as of today:  Health Maintenance   Topic Date Due    LIPID  06/25/2025    DTAP/TDAP/TD VACCINE (7 - Td or Tdap) 07/16/2025    MEDICARE ANNUAL WELLNESS VISIT  06/10/2026    A1C  06/10/2026    ADVANCE CARE PLANNING  06/10/2030    ZOSTER VACCINE (1 of 2) 03/21/2050    HEPATITIS C SCREENING  Completed    HIV SCREENING  Completed    PHQ-2 (once per calendar year)  Completed    INFLUENZA VACCINE  Completed    HPV VACCINE  Completed    MENINGITIS VACCINE  Completed    HEPATITIS B VACCINE  Completed    COVID-19 VACCINE  Completed    PNEUMOCOCCAL VACCINE: PEDIATRICS (0 to 5 YEARS) AND AT-RISK PATIENTS (6 to 49 YEARS)  Aged Out    MENINGITIS B VACCINE  Aged Out            Objective    Exam  BP (!) 139/92   Pulse 85   Temp 98  F (36.7  C) (Temporal)   Resp 17   Ht 1.765 m (5' 9.5\")   Wt 106.5 kg (234 lb 14.4 oz)   SpO2 98%   BMI 34.19 kg/m     Estimated body mass index is 34.19 kg/m  as calculated from the following:    Height as of this encounter: 1.765 m (5' 9.5\").    Weight as of this encounter: 106.5 kg (234 lb 14.4 oz).    Physical Exam           6/10/2025   Mini Cog   Clock Draw Score 2 Normal   3 Item Recall 3 objects recalled   Mini Cog Total Score 5              Signed Electronically by: Tanner Sierra MD    "

## 2025-06-10 NOTE — PATIENT INSTRUCTIONS
Patient Education   Please have guardian sign BIANKA for me to communicate with your Psychiatric care team so we can discuss guardianship issues  Preventive Care Advice   This is general advice given by our system to help you stay healthy. However, your care team may have specific advice just for you. Please talk to your care team about your preventive care needs.  Nutrition  Eat 5 or more servings of fruits and vegetables each day.  Try wheat bread, brown rice and whole grain pasta (instead of white bread, rice, and pasta).  Get enough calcium and vitamin D. Check the label on foods and aim for 100% of the RDA (recommended daily allowance).  Lifestyle  Exercise at least 150 minutes each week  (30 minutes a day, 5 days a week).  Do muscle strengthening activities 2 days a week. These help control your weight and prevent disease.  No smoking.  Wear sunscreen to prevent skin cancer.  Have a dental exam and cleaning every 6 months.  Yearly exams  See your health care team every year to talk about:  Any changes in your health.  Any medicines your care team has prescribed.  Preventive care, family planning, and ways to prevent chronic diseases.  Shots (vaccines)   HPV shots (up to age 26), if you've never had them before.  Hepatitis B shots (up to age 59), if you've never had them before.  COVID-19 shot: Get this shot when it's due.  Flu shot: Get a flu shot every year.  Tetanus shot: Get a tetanus shot every 10 years.  Pneumococcal, hepatitis A, and RSV shots: Ask your care team if you need these based on your risk.  Shingles shot (for age 50 and up)  General health tests  Diabetes screening:  Starting at age 35, Get screened for diabetes at least every 3 years.  If you are younger than age 35, ask your care team if you should be screened for diabetes.  Cholesterol test: At age 39, start having a cholesterol test every 5 years, or more often if advised.  Bone density scan (DEXA): At age 50, ask your care team if you should  have this scan for osteoporosis (brittle bones).  Hepatitis C: Get tested at least once in your life.  STIs (sexually transmitted infections)  Before age 24: Ask your care team if you should be screened for STIs.  After age 24: Get screened for STIs if you're at risk. You are at risk for STIs (including HIV) if:  You are sexually active with more than one person.  You don't use condoms every time.  You or a partner was diagnosed with a sexually transmitted infection.  If you are at risk for HIV, ask about PrEP medicine to prevent HIV.  Get tested for HIV at least once in your life, whether you are at risk for HIV or not.  Cancer screening tests  Cervical cancer screening: If you have a cervix, begin getting regular cervical cancer screening tests starting at age 21.  Breast cancer scan (mammogram): If you've ever had breasts, begin having regular mammograms starting at age 40. This is a scan to check for breast cancer.  Colon cancer screening: It is important to start screening for colon cancer at age 45.  Have a colonoscopy test every 10 years (or more often if you're at risk) Or, ask your provider about stool tests like a FIT test every year or Cologuard test every 3 years.  To learn more about your testing options, visit:   .  For help making a decision, visit:   https://bit.ly/zu84058.  Prostate cancer screening test: If you have a prostate, ask your care team if a prostate cancer screening test (PSA) at age 55 is right for you.  Lung cancer screening: If you are a current or former smoker ages 50 to 80, ask your care team if ongoing lung cancer screenings are right for you.  For informational purposes only. Not to replace the advice of your health care provider. Copyright   2023 [employer]. All rights reserved. Clinically reviewed by the [facility]. [employer] [id number] - REV 01/24.  Learning About Stress  What is stress?     Stress is your body's response to a hard  situation. Your body can have a physical, emotional, or mental response. Stress is a fact of life for most people, and it affects everyone differently. What causes stress for you may not be stressful for someone else.  A lot of things can cause stress. You may feel stress when you go on a job interview, take a test, or run a race. This kind of short-term stress is normal and even useful. It can help you if you need to work hard or react quickly. For example, stress can help you finish an important job on time.  Long-term stress is caused by ongoing stressful situations or events. Examples of long-term stress include long-term health problems, ongoing problems at work, or conflicts in your family. Long-term stress can harm your health.  How does stress affect your health?  When you are stressed, your body responds as though you are in danger. It makes hormones that speed up your heart, make you breathe faster, and give you a burst of energy. This is called the fight-or-flight stress response. If the stress is over quickly, your body goes back to normal and no harm is done.  But if stress happens too often or lasts too long, it can have bad effects. Long-term stress can make you more likely to get sick, and it can make symptoms of some diseases worse. If you tense up when you are stressed, you may develop neck, shoulder, or low back pain. Stress is linked to high blood pressure and heart disease.  Stress also harms your emotional health. It can make you matta, tense, or depressed. Your relationships may suffer, and you may not do well at work or school.  What can you do to manage stress?  You can try these things to help manage stress:   Do something active. Exercise or activity can help reduce stress. Walking is a great way to get started. Even everyday activities such as housecleaning or yard work can help.  Try yoga or courtney chi. These techniques combine exercise and meditation. You may need some training at first to  learn them.  Do something you enjoy. For example, listen to music or go to a movie. Practice your hobby or do volunteer work.  Meditate. This can help you relax, because you are not worrying about what happened before or what may happen in the future.  Do guided imagery. Imagine yourself in any setting that helps you feel calm. You can use online videos, books, or a teacher to guide you.  Do breathing exercises. For example:  From a standing position, bend forward from the waist with your knees slightly bent. Let your arms dangle close to the floor.  Breathe in slowly and deeply as you return to a standing position. Roll up slowly and lift your head last.  Hold your breath for just a few seconds in the standing position.  Breathe out slowly and bend forward from the waist.  Let your feelings out. Talk, laugh, cry, and express anger when you need to. Talking with supportive friends or family, a counselor, or a linda leader about your feelings is a healthy way to relieve stress. Avoid discussing your feelings with people who make you feel worse.  Write. It may help to write about things that are bothering you. This helps you find out how much stress you feel and what is causing it. When you know this, you can find better ways to cope.  What can you do to prevent stress?  You might try some of these things to help prevent stress:  Manage your time. This helps you find time to do the things you want and need to do.  Get enough sleep. Your body recovers from the stresses of the day while you are sleeping.  Get support. Your family, friends, and community can make a difference in how you experience stress.  Limit your news feed. Avoid or limit time on social media or news that may make you feel stressed.  Do something active. Exercise or activity can help reduce stress. Walking is a great way to get started.  Where can you learn more?  Go to https://www.healthwise.net/patiented  Enter N032 in the search box to learn more  "about \"Learning About Stress.\"  Current as of: October 24, 2024  Content Version: 14.5 2024-2025 [employer].   Care instructions adapted under license by your healthcare professional. If you have questions about a medical condition or this instruction, always ask your healthcare professional. [employer] disclaims any warranty or liability for your use of this information.       "

## 2025-06-11 ENCOUNTER — RESULTS FOLLOW-UP (OUTPATIENT)
Dept: FAMILY MEDICINE | Facility: CLINIC | Age: 25
End: 2025-06-11

## 2025-07-02 DIAGNOSIS — J30.1 NON-SEASONAL ALLERGIC RHINITIS DUE TO POLLEN: ICD-10-CM

## 2025-07-02 DIAGNOSIS — F51.01 PRIMARY INSOMNIA: ICD-10-CM

## 2025-07-02 RX ORDER — LORATADINE 10 MG/1
1 TABLET ORAL DAILY
Qty: 90 TABLET | Refills: 3 | Status: SHIPPED | OUTPATIENT
Start: 2025-07-02

## 2025-07-02 NOTE — TELEPHONE ENCOUNTER
"Request for medication refill:    Medication Name: loratadine (CLARITIN) 10 MG tablet     melatonin 5 MG tablet     Providers if patient needs an appointment and you are willing to give a one month supply please refill for one month and  send a MyChart using \".SMILLIMITEDREFILL\" .Or route chart to \"P [facility] \" . And use the phrase \" SMIRXFOLLOWUP\"To call patient and inform of limited refill and providers request to make an appointment. (Giving one month refill in non controlled medications is strongly recommended before denial)    If refill has been denied, meaning absolutely no refills without visit, please complete the smart phrase \".SMIRXREFUSE\" and route it to the \"P [facility] MED REFILLS\"  pool to inform the patient and the pharmacy.    Judy Posey, [facility]      "

## 2025-07-31 DIAGNOSIS — E66.813 OBESITY, CLASS III, BMI 40-49.9 (MORBID OBESITY) (H): ICD-10-CM

## 2025-07-31 RX ORDER — METFORMIN HYDROCHLORIDE 500 MG/1
1000 TABLET, EXTENDED RELEASE ORAL 2 TIMES DAILY WITH MEALS
Qty: 120 TABLET | Refills: 3 | Status: SHIPPED | OUTPATIENT
Start: 2025-07-31

## 2025-07-31 NOTE — TELEPHONE ENCOUNTER
"Request for medication refill:    Medication Name: metFORMIN (GLUCOPHAGE XR) 500 MG 24 hr tablet     Providers if patient needs an appointment and you are willing to give a one month supply please refill for one month and  send a MyChart using \".SMILLIMITEDREFILL\" .Or route chart to \"P [facility] \" . And use the phrase \" SMIRXFOLLOWUP\"To call patient and inform of limited refill and providers request to make an appointment. (Giving one month refill in non controlled medications is strongly recommended before denial)    If refill has been denied, meaning absolutely no refills without visit, please complete the smart phrase \".SMIRXREFUSE\" and route it to the \"P [facility] MED REFILLS\"  pool to inform the patient and the pharmacy.    Mariam Lopez RN      "